# Patient Record
Sex: FEMALE | Race: BLACK OR AFRICAN AMERICAN | NOT HISPANIC OR LATINO | Employment: FULL TIME | URBAN - METROPOLITAN AREA
[De-identification: names, ages, dates, MRNs, and addresses within clinical notes are randomized per-mention and may not be internally consistent; named-entity substitution may affect disease eponyms.]

---

## 2018-12-10 PROBLEM — O26.892 ABDOMINAL PAIN IN PREGNANCY, SECOND TRIMESTER: Status: ACTIVE | Noted: 2018-12-10

## 2018-12-10 PROBLEM — R10.9 ABDOMINAL PAIN IN PREGNANCY, SECOND TRIMESTER: Status: ACTIVE | Noted: 2018-12-10

## 2019-04-02 PROBLEM — O36.8390 NON-REASSURING ELECTRONIC FETAL MONITORING TRACING: Status: ACTIVE | Noted: 2019-04-02

## 2019-04-11 PROBLEM — O14.13 HYPERTENSION IN PREGNANCY, PREECLAMPSIA, SEVERE, ANTEPARTUM, THIRD TRIMESTER: Status: ACTIVE | Noted: 2019-04-02

## 2020-07-29 PROBLEM — O36.8390 NON-REASSURING ELECTRONIC FETAL MONITORING TRACING: Status: ACTIVE | Noted: 2020-07-29

## 2020-07-30 PROBLEM — O36.8190 DECREASED FETAL MOVEMENT: Status: ACTIVE | Noted: 2020-07-30

## 2020-09-23 PROBLEM — O13.3 GESTATIONAL HYPERTENSION W/O SIGNIFICANT PROTEINURIA IN 3RD TRIMESTER: Status: ACTIVE | Noted: 2020-09-23

## 2020-10-01 PROBLEM — Z98.891 STATUS POST REPEAT LOW TRANSVERSE CESAREAN SECTION: Status: ACTIVE | Noted: 2020-10-01

## 2020-10-01 PROBLEM — Z34.90 PREGNANCY: Status: ACTIVE | Noted: 2020-10-01

## 2020-10-03 PROBLEM — Z98.891 STATUS POST REPEAT LOW TRANSVERSE CESAREAN SECTION: Status: RESOLVED | Noted: 2020-10-01 | Resolved: 2020-10-03

## 2020-12-28 PROBLEM — O13.3 GESTATIONAL HYPERTENSION W/O SIGNIFICANT PROTEINURIA IN 3RD TRIMESTER: Status: RESOLVED | Noted: 2020-09-23 | Resolved: 2020-12-28

## 2022-07-11 ENCOUNTER — OFFICE VISIT (OUTPATIENT)
Dept: URGENT CARE | Facility: CLINIC | Age: 27
End: 2022-07-11
Payer: MEDICAID

## 2022-07-11 VITALS
HEIGHT: 61 IN | BODY MASS INDEX: 24.55 KG/M2 | SYSTOLIC BLOOD PRESSURE: 128 MMHG | HEART RATE: 74 BPM | RESPIRATION RATE: 18 BRPM | WEIGHT: 130 LBS | OXYGEN SATURATION: 99 % | DIASTOLIC BLOOD PRESSURE: 85 MMHG | TEMPERATURE: 98 F

## 2022-07-11 DIAGNOSIS — H10.32 ACUTE BACTERIAL CONJUNCTIVITIS OF LEFT EYE: ICD-10-CM

## 2022-07-11 DIAGNOSIS — J02.9 ACUTE PHARYNGITIS, UNSPECIFIED ETIOLOGY: Primary | ICD-10-CM

## 2022-07-11 PROCEDURE — 1160F PR REVIEW ALL MEDS BY PRESCRIBER/CLIN PHARMACIST DOCUMENTED: ICD-10-PCS | Mod: CPTII,S$GLB,, | Performed by: NURSE PRACTITIONER

## 2022-07-11 PROCEDURE — 99204 OFFICE O/P NEW MOD 45 MIN: CPT | Mod: S$GLB,,, | Performed by: NURSE PRACTITIONER

## 2022-07-11 PROCEDURE — 3079F DIAST BP 80-89 MM HG: CPT | Mod: CPTII,S$GLB,, | Performed by: NURSE PRACTITIONER

## 2022-07-11 PROCEDURE — 3074F SYST BP LT 130 MM HG: CPT | Mod: CPTII,S$GLB,, | Performed by: NURSE PRACTITIONER

## 2022-07-11 PROCEDURE — 1159F MED LIST DOCD IN RCRD: CPT | Mod: CPTII,S$GLB,, | Performed by: NURSE PRACTITIONER

## 2022-07-11 PROCEDURE — 3008F PR BODY MASS INDEX (BMI) DOCUMENTED: ICD-10-PCS | Mod: CPTII,S$GLB,, | Performed by: NURSE PRACTITIONER

## 2022-07-11 PROCEDURE — 1159F PR MEDICATION LIST DOCUMENTED IN MEDICAL RECORD: ICD-10-PCS | Mod: CPTII,S$GLB,, | Performed by: NURSE PRACTITIONER

## 2022-07-11 PROCEDURE — 3074F PR MOST RECENT SYSTOLIC BLOOD PRESSURE < 130 MM HG: ICD-10-PCS | Mod: CPTII,S$GLB,, | Performed by: NURSE PRACTITIONER

## 2022-07-11 PROCEDURE — 99204 PR OFFICE/OUTPT VISIT, NEW, LEVL IV, 45-59 MIN: ICD-10-PCS | Mod: S$GLB,,, | Performed by: NURSE PRACTITIONER

## 2022-07-11 PROCEDURE — 1160F RVW MEDS BY RX/DR IN RCRD: CPT | Mod: CPTII,S$GLB,, | Performed by: NURSE PRACTITIONER

## 2022-07-11 PROCEDURE — 3008F BODY MASS INDEX DOCD: CPT | Mod: CPTII,S$GLB,, | Performed by: NURSE PRACTITIONER

## 2022-07-11 PROCEDURE — 3079F PR MOST RECENT DIASTOLIC BLOOD PRESSURE 80-89 MM HG: ICD-10-PCS | Mod: CPTII,S$GLB,, | Performed by: NURSE PRACTITIONER

## 2022-07-11 RX ORDER — OFLOXACIN 3 MG/ML
1 SOLUTION/ DROPS OPHTHALMIC 4 TIMES DAILY
Qty: 5 ML | Refills: 0 | Status: SHIPPED | OUTPATIENT
Start: 2022-07-11 | End: 2022-07-16

## 2022-07-11 RX ORDER — NAPROXEN 500 MG/1
500 TABLET ORAL 2 TIMES DAILY WITH MEALS
Qty: 14 TABLET | Refills: 0 | Status: SHIPPED | OUTPATIENT
Start: 2022-07-11 | End: 2022-07-18

## 2022-07-11 NOTE — PROGRESS NOTES
"Subjective:       Patient ID: Steve Sharma is a 26 y.o. female.    Vitals:  height is 5' 1" (1.549 m) and weight is 59 kg (130 lb). Her tympanic temperature is 98.4 °F (36.9 °C). Her blood pressure is 128/85 and her pulse is 74. Her respiration is 18 and oxygen saturation is 99%.     Chief Complaint: Conjunctivitis and Sore Throat    Pt states that she started with a sore throat yesterday and noticed spots in her throat today.     Conjunctivitis  This is a new problem. The current episode started today. The problem occurs constantly. The problem has been gradually worsening. Associated symptoms include a sore throat. Nothing aggravates the symptoms. She has tried nothing for the symptoms.   Sore Throat   This is a new problem. The current episode started yesterday. The problem has been waxing and waning. Neither side of throat is experiencing more pain than the other. There has been no fever. She has tried nothing for the symptoms.       HENT: Positive for sore throat.        Objective:      Physical Exam   Constitutional: She is oriented to person, place, and time. She appears well-developed. She is cooperative.  Non-toxic appearance. No distress.   HENT:   Head: Normocephalic and atraumatic.   Ears:   Right Ear: Tympanic membrane, external ear and ear canal normal.   Left Ear: Tympanic membrane, external ear and ear canal normal.   Nose: Nose normal. No mucosal edema, rhinorrhea or nasal deformity. No epistaxis. Right sinus exhibits no maxillary sinus tenderness and no frontal sinus tenderness. Left sinus exhibits no maxillary sinus tenderness and no frontal sinus tenderness.   Mouth/Throat: Uvula is midline and mucous membranes are normal. No trismus in the jaw. Normal dentition. No uvula swelling. Posterior oropharyngeal erythema present. No oropharyngeal exudate or posterior oropharyngeal edema.       Eyes: EOM and lids are normal. Pupils are equal, round, and reactive to light. Left eye exhibits discharge. " Right conjunctiva is not injected. Left conjunctiva is injected. No scleral icterus. vision grossly intact   Neck: Trachea normal and phonation normal. Neck supple. No edema present. No erythema present. No neck rigidity present.   Cardiovascular: Normal rate, regular rhythm, normal heart sounds and normal pulses.   Pulmonary/Chest: Effort normal and breath sounds normal. No respiratory distress. She has no decreased breath sounds. She has no rhonchi.   Abdominal: Normal appearance.   Musculoskeletal: Normal range of motion.         General: No deformity. Normal range of motion.   Neurological: She is alert and oriented to person, place, and time. She exhibits normal muscle tone. Coordination normal.   Skin: Skin is warm, dry, intact, not diaphoretic and not pale.   Psychiatric: Her speech is normal and behavior is normal. Judgment and thought content normal.   Nursing note and vitals reviewed.        Assessment:       1. Acute pharyngitis, unspecified etiology    2. Acute bacterial conjunctivitis of left eye          Plan:         Acute pharyngitis, unspecified etiology  -     naproxen (NAPROSYN) 500 MG tablet; Take 1 tablet (500 mg total) by mouth 2 (two) times daily with meals. for 7 days  Dispense: 14 tablet; Refill: 0    Acute bacterial conjunctivitis of left eye  -     ofloxacin (OCUFLOX) 0.3 % ophthalmic solution; Place 1 drop into the left eye 4 (four) times daily. for 5 days  Dispense: 5 mL; Refill: 0

## 2022-07-11 NOTE — LETTER
July 11, 2022      [facility]  [address], [address]  [place] [address]  Phone: [phone]  Fax: [phone]       Patient: Steve Sharma   YOB: 1995  Date of Visit: 07/11/2022    To Whom It May Concern:    Fernanda Sharma  was at [facility] on 07/11/2022. The patient may return to work/school on 7/13/2022 with no restrictions if symptoms have improved. If you have any questions or concerns, or if I can be of further assistance, please do not hesitate to contact me.    Sincerely,    Rebecca Oreilly NP

## 2022-07-12 NOTE — PATIENT INSTRUCTIONS
EYE   1) Use the eye drops as prescribed while awake initially. Use the eye drops for 24 hours after the last day of eye symptoms.  You are contagious until you have been on abx for at least 24 hours.  2) Wash your hands regularly to help prevent the spread of infection.  3) It is also a good idea to change your pillow case each morning until you are symptom free to help prevent spread of infection.  4) Do not wear your contact lens (if you use them) for at least 5 days after you stop having symptoms and are rechecked by your doctor. Throw away the contacts, contact solution and carrying case you were using and start with new material. You may also need to throw away any eye makeup/mascara that you used while your eye was irritated.  5) If you develop worsening eye symptoms or change in your vision, seek medical care immediately, either with your ophthalomologist or the ER.  6) If your condition fails to improve in a timely manner, you should receive another evaluation by your Primary Care Provider/Pediatrician to discuss your concerns or return to urgent care for a recheck.  If your condition worsens at any time, you should report immediately to your nearest Emergency Department for further evaluation. **You must understand that you have received Urgent Care treatment only and that you may be released before all of your medical problems are known or treated. You, the patient, are responsible to arrange for follow-up care as instructed.      The following are suggestions to help you with upper respiratory symptoms.    Congestion:    Nasal Saline  Nasal saline is available over the counter. There are several different commercial preparations such as Ocean spray and Ayr spray. There is no limit on the use of Nasal saline. Saline is used by snorting the mist up into the nose then later gently blowing the nose to get rid of any secretions that it has loosened.     Mucous Thinners and Decongestants  Mucous thinners and  decongestants are used to shrink down the tissues and promote sinus drainage. There are multiple prescription and over-the-counter medications available. A mucous thinner will tend to be drying unless you are also drinking plenty of water when taking these. If you have high blood pressure, it is very important to monitor your pressure while on decongestants. The mucous thinner/decongestant combinations are typically given twice per day. However, some people will be unable to tolerate these at night and should only take them once per day.    Decongestant Nasal Sprays  Over-the-counter decongestant nasal spray such as Afrin, may be helpful as an initial step in treating upper respiratory infections. This spray can be used for up to approximately 3 days and is used no more than twice per day. Topical nasal decongestant spray for longer than 5 days will result in a physical addiction, in which the nasal lining will become significantly swollen and irritated until the spray is used again. May cause elevated blood pressure    Use pseudoephedrine (behind the counter)  for sinus pressure and congestion- Pseudoephedrine 30 mg up to 240 mg /day. Common brands include Sudafed, Zephrex-D Wal-phed.  Warning: It can raise your blood pressure and give you palpitations, avoid with history of high blood pressure, palpitations, and severe cardiac disease.  Coricidin HBP is okay to use if you have high blood pressure.     Nasal Steroids  Nasal steroid medications such as Flonase are useful for upper respiratory infections, allergies, and sensitivities to airborne irritants. Unfortunately, they do not begin to work for 1-2 days, and they do not reach their maximum benefit for approximately 2-3 weeks. Initial therapy is typically 2 puffs per nostril twice per day. This should be used for only a few days, then the maintenance dosage is one or two puffs per nostril once per day. This can be done at any time of the day. The most effective  way to use any nasal medication is to look down at your toes when spraying it in. Aim slightly away from the septum (dividing plate between the nostrils), and gently inhale. This ensures that the spray will go into the sinus cavities and not straight up into the nose. A good way to avoid spraying onto the septum is to use the right hand to spray into the left nostril, and vice versa for the right nostril. Occasionally, nasal steroids can increase the risk of nosebleeds, but in general they are very well tolerated. If you develop a bloody nose, stop using the medication immediately.    Clear Runny Nose/Allergic Rhinitis:  Use an antihistamine to help dry you out.   Antihistamines  Antihistamines are available both over-the-counter and as a prescription. There are also various decongestant and antihistamine combinations available such as Claritin, Allegra, and Zyrtec. It is best to take any antihistamine-decongestant combination in the morning to avoid insomnia. Zyrtec should probably be taken at night, in order to reduce the chance of sleepiness during the daytime. If there is a significant infection present and secretions are already thickened, it is recommended to discontinue antihistamines and use a mucous thinner/decongestant combination.      Oral Steroids  Oral steroids can be used with more sever infections. Often, they are the only medications that will reduce the symptoms of pressure and allow the nasal sinuses to drain. These are best taken on a full stomach and earlier in the day is better. They may give you some irritability, stomach upset, or hyperactivity. This can also interfere with sleep.     A person who has high blood pressure or diabetes should be very careful to monitor their blood pressure or blood glucose while taking steroids. Steroids can have multiple side effects especially when taken long-term. Short-term doses are usually very well tolerated and extremely effective in controlling the  symptoms associated with acute and chronic sinus infections and severe allergies. The use of steroids for greater than approximately seven days requires a tapering down in order to discontinue them. You should not abruptly stop your steroid if you have been taking the same dose for greater than one week.    Antibiotic Treatment  Finally, when all of these other measures have failed, and a bacterial infection is present, an antibiotic will be prescribed. The most common symptoms of acute sinusitis of a bacterial nature are pain, pressure, and thick and colored nasal drainage. However, not all colored drainage means that there is a bacterial infection present. According to the Center for Disease Control, only 2% of colds will progress to result in bacterial sinusitis. Most upper respiratory infections should NOT be treated with antibiotics. Antibiotics should be reserved for upper respiratory infections which last longer than 10 days, or which worsen after 5 to 7 days of treatment. The use of antibiotics for nonbacterial upper respiratory infections has resulted in a severe problem with the emergence of bacteria which are resistant to multiple forms of antibiotics, and some bacteria are currently only treatable with intravenous antibiotics.    Body Aches/Pains/Fever  For patients who are not allergic to and are not on anticoagulants, you can alternate Tylenol every 4 hours and Motrin every 6 hours for fever above 100.4F and/or pain.  For patients who are allergic or intolerant to NSAIDS, have gastritis, gastric ulcers, or history of GI bleeds, are pregnant, or are on anticoagulant therapy, you can take Tylenol every 4 hours as needed for fever above 100.4F and/or pain.     Maintain adequate hydration -  Rest and keep yourself/patient well hydrated. For adults, it is recommended to drink at least 8 glasses of water daily.  This may help thin secretions and soothe the respiratory mucosa.     Sore Throat  Perform warm,  salt water gargles (1/2 tsp salt to 1 cup warm water) to help reduce inflammation and throat discomfort. Chloraseptic sprays and throat lozenges will also help with your throat pain.    COUGH  A viral cough may linger for 3 to 4 weeks but should steadily improve over time. Coughing is the body's natural way to clear mucus and help get rid of bacteria and viruses. Therefore, cough suppressants are usually not recommended.      Use mucinex (guaifenisin) up to 2400mg/day to help clear and break up/loosen mucus/congestion from the chest when you have a cold or flu.      Common cough suppressants include the ingredient dextromethorphan or DM, (such as Mucinex DM) available over-the-counter and can be used for cough to stop the tickle in the back of your throat.      ? Honey may be beneficial, especially on nocturnal cough 1 to 2 teaspoons can be taken straight or diluted in tea, juice or other liquid.    The antioxidants in honey are an important contributor to its decongestant properties. Generally speaking, darker honey contains more antioxidants. Buckwheat and avocado honey are particularly good choices. If these honeys are not available in your area, choose the darkest honey you can find.    Take all medications as directed. If you have been prescribed antibiotics, make sure to complete them.     If your condition fails to improve in a timely manner, you should receive another evaluation by your Primary Care Provider to discuss your concerns or return to urgent care for a recheck.      **You must understand that you have received Urgent Care treatment only and that you may be released before all of your medical problems are known or treated. You, the patient, are responsible to arrange for follow-up care as instructed. If your condition worsens at any time, you should report immediately to your nearest Emergency Department for further evaluation.

## 2023-09-22 PROBLEM — O16.9 HYPERTENSION AFFECTING PREGNANCY: Status: ACTIVE | Noted: 2023-09-22

## 2023-09-23 PROBLEM — O36.8190 DECREASED FETAL MOVEMENT: Status: RESOLVED | Noted: 2020-07-30 | Resolved: 2023-09-23

## 2023-09-23 PROBLEM — O36.8390 NON-REASSURING ELECTRONIC FETAL MONITORING TRACING: Status: RESOLVED | Noted: 2020-07-29 | Resolved: 2023-09-23

## 2023-09-23 PROBLEM — O14.12 SEVERE PRE-ECLAMPSIA IN SECOND TRIMESTER: Status: ACTIVE | Noted: 2023-09-23

## 2023-09-24 ENCOUNTER — HOSPITAL ENCOUNTER (INPATIENT)
Facility: OTHER | Age: 28
LOS: 5 days | Discharge: HOME | End: 2023-09-29
Attending: OBSTETRICS & GYNECOLOGY | Admitting: OBSTETRICS & GYNECOLOGY
Payer: MEDICAID

## 2023-09-24 ENCOUNTER — ANESTHESIA (OUTPATIENT)
Dept: OBSTETRICS AND GYNECOLOGY | Facility: OTHER | Age: 28
End: 2023-09-24

## 2023-09-24 ENCOUNTER — ANESTHESIA EVENT (OUTPATIENT)
Dept: OBSTETRICS AND GYNECOLOGY | Facility: OTHER | Age: 28
End: 2023-09-24

## 2023-09-24 DIAGNOSIS — O14.10 PRE-ECLAMPSIA, SEVERE: ICD-10-CM

## 2023-09-24 DIAGNOSIS — O14.12 SEVERE PRE-ECLAMPSIA IN SECOND TRIMESTER: Primary | ICD-10-CM

## 2023-09-24 DIAGNOSIS — Z98.891 HISTORY OF CESAREAN SECTION: ICD-10-CM

## 2023-09-24 DIAGNOSIS — R07.89 CHEST TIGHTNESS: ICD-10-CM

## 2023-09-24 DIAGNOSIS — O14.90 PREECLAMPSIA: ICD-10-CM

## 2023-09-24 PROBLEM — Z34.90 PREGNANCY: Status: RESOLVED | Noted: 2020-10-01 | Resolved: 2023-09-24

## 2023-09-24 PROBLEM — O16.9 HYPERTENSION AFFECTING PREGNANCY: Status: RESOLVED | Noted: 2023-09-22 | Resolved: 2023-09-24

## 2023-09-24 PROBLEM — Z3A.25 25 WEEKS GESTATION OF PREGNANCY: Status: ACTIVE | Noted: 2023-09-24

## 2023-09-24 PROBLEM — D64.9 ANEMIA: Status: ACTIVE | Noted: 2023-09-24

## 2023-09-24 LAB
ALBUMIN SERPL BCP-MCNC: 2.2 G/DL (ref 3.5–5.2)
ALP SERPL-CCNC: 69 U/L (ref 55–135)
ALT SERPL W/O P-5'-P-CCNC: 10 U/L (ref 10–44)
ANION GAP SERPL CALC-SCNC: 7 MMOL/L (ref 8–16)
AST SERPL-CCNC: 13 U/L (ref 10–40)
BASOPHILS # BLD AUTO: 0.02 K/UL (ref 0–0.2)
BASOPHILS NFR BLD: 0.1 % (ref 0–1.9)
BILIRUB SERPL-MCNC: 0.1 MG/DL (ref 0.1–1)
BUN SERPL-MCNC: 15 MG/DL (ref 6–20)
CALCIUM SERPL-MCNC: 6.4 MG/DL (ref 8.7–10.5)
CHLORIDE SERPL-SCNC: 107 MMOL/L (ref 95–110)
CO2 SERPL-SCNC: 21 MMOL/L (ref 23–29)
CREAT SERPL-MCNC: 0.7 MG/DL (ref 0.5–1.4)
CREAT UR-MCNC: 71.1 MG/DL (ref 15–325)
DIFFERENTIAL METHOD: ABNORMAL
EOSINOPHIL # BLD AUTO: 0 K/UL (ref 0–0.5)
EOSINOPHIL NFR BLD: 0.1 % (ref 0–8)
ERYTHROCYTE [DISTWIDTH] IN BLOOD BY AUTOMATED COUNT: 13.9 % (ref 11.5–14.5)
EST. GFR  (NO RACE VARIABLE): >60 ML/MIN/1.73 M^2
GLUCOSE SERPL-MCNC: 109 MG/DL (ref 70–110)
HCT VFR BLD AUTO: 29.5 % (ref 37–48.5)
HGB BLD-MCNC: 9.5 G/DL (ref 12–16)
IMM GRANULOCYTES # BLD AUTO: 0.17 K/UL (ref 0–0.04)
IMM GRANULOCYTES NFR BLD AUTO: 1.1 % (ref 0–0.5)
LYMPHOCYTES # BLD AUTO: 1.5 K/UL (ref 1–4.8)
LYMPHOCYTES NFR BLD: 9.4 % (ref 18–48)
MCH RBC QN AUTO: 25.5 PG (ref 27–31)
MCHC RBC AUTO-ENTMCNC: 32.2 G/DL (ref 32–36)
MCV RBC AUTO: 79 FL (ref 82–98)
MONOCYTES # BLD AUTO: 1.6 K/UL (ref 0.3–1)
MONOCYTES NFR BLD: 10.3 % (ref 4–15)
NEUTROPHILS # BLD AUTO: 12.4 K/UL (ref 1.8–7.7)
NEUTROPHILS NFR BLD: 79 % (ref 38–73)
NRBC BLD-RTO: 0 /100 WBC
PLATELET # BLD AUTO: 401 K/UL (ref 150–450)
PMV BLD AUTO: 9.5 FL (ref 9.2–12.9)
POTASSIUM SERPL-SCNC: 3.8 MMOL/L (ref 3.5–5.1)
PROT SERPL-MCNC: 5.4 G/DL (ref 6–8.4)
PROT UR-MCNC: 266 MG/DL (ref 0–15)
PROT/CREAT UR: 3.74 MG/G{CREAT} (ref 0–0.2)
RBC # BLD AUTO: 3.72 M/UL (ref 4–5.4)
SODIUM SERPL-SCNC: 135 MMOL/L (ref 136–145)
WBC # BLD AUTO: 15.7 K/UL (ref 3.9–12.7)

## 2023-09-24 PROCEDURE — 11000001 HC ACUTE MED/SURG PRIVATE ROOM

## 2023-09-24 PROCEDURE — 36415 COLL VENOUS BLD VENIPUNCTURE: CPT | Performed by: OBSTETRICS & GYNECOLOGY

## 2023-09-24 PROCEDURE — 59025 PR FETAL 2N-STRESS TEST: ICD-10-PCS | Mod: 26,,, | Performed by: OBSTETRICS & GYNECOLOGY

## 2023-09-24 PROCEDURE — 99223 PR INITIAL HOSPITAL CARE,LEVL III: ICD-10-PCS | Mod: 25,,, | Performed by: OBSTETRICS & GYNECOLOGY

## 2023-09-24 PROCEDURE — 25000003 PHARM REV CODE 250

## 2023-09-24 PROCEDURE — 86592 SYPHILIS TEST NON-TREP QUAL: CPT | Performed by: OBSTETRICS & GYNECOLOGY

## 2023-09-24 PROCEDURE — 80053 COMPREHEN METABOLIC PANEL: CPT

## 2023-09-24 PROCEDURE — 93005 ELECTROCARDIOGRAM TRACING: CPT

## 2023-09-24 PROCEDURE — 99900035 HC TECH TIME PER 15 MIN (STAT)

## 2023-09-24 PROCEDURE — 93010 ELECTROCARDIOGRAM REPORT: CPT | Mod: ,,, | Performed by: INTERNAL MEDICINE

## 2023-09-24 PROCEDURE — 84156 ASSAY OF PROTEIN URINE: CPT

## 2023-09-24 PROCEDURE — 36415 COLL VENOUS BLD VENIPUNCTURE: CPT

## 2023-09-24 PROCEDURE — 93010 EKG 12-LEAD: ICD-10-PCS | Mod: ,,, | Performed by: INTERNAL MEDICINE

## 2023-09-24 PROCEDURE — 59025 FETAL NON-STRESS TEST: CPT | Mod: 26,,, | Performed by: OBSTETRICS & GYNECOLOGY

## 2023-09-24 PROCEDURE — 63600175 PHARM REV CODE 636 W HCPCS

## 2023-09-24 PROCEDURE — 99223 1ST HOSP IP/OBS HIGH 75: CPT | Mod: 25,,, | Performed by: OBSTETRICS & GYNECOLOGY

## 2023-09-24 PROCEDURE — 85025 COMPLETE CBC W/AUTO DIFF WBC: CPT

## 2023-09-24 RX ORDER — ASPIRIN 81 MG/1
81 TABLET ORAL DAILY
Status: DISCONTINUED | OUTPATIENT
Start: 2023-09-24 | End: 2023-09-26

## 2023-09-24 RX ORDER — DIPHENHYDRAMINE HCL 25 MG
25 CAPSULE ORAL EVERY 4 HOURS PRN
Status: DISCONTINUED | OUTPATIENT
Start: 2023-09-24 | End: 2023-09-24

## 2023-09-24 RX ORDER — ONDANSETRON 8 MG/1
8 TABLET, ORALLY DISINTEGRATING ORAL EVERY 8 HOURS PRN
Status: DISCONTINUED | OUTPATIENT
Start: 2023-09-24 | End: 2023-09-24

## 2023-09-24 RX ORDER — DIPHENHYDRAMINE HYDROCHLORIDE 50 MG/ML
25 INJECTION INTRAMUSCULAR; INTRAVENOUS EVERY 4 HOURS PRN
Status: DISCONTINUED | OUTPATIENT
Start: 2023-09-24 | End: 2023-09-24

## 2023-09-24 RX ORDER — PRENATAL WITH FERROUS FUM AND FOLIC ACID 3080; 920; 120; 400; 22; 1.84; 3; 20; 10; 1; 12; 200; 27; 25; 2 [IU]/1; [IU]/1; MG/1; [IU]/1; MG/1; MG/1; MG/1; MG/1; MG/1; MG/1; UG/1; MG/1; MG/1; MG/1; MG/1
1 TABLET ORAL DAILY
Status: DISCONTINUED | OUTPATIENT
Start: 2023-09-24 | End: 2023-09-27

## 2023-09-24 RX ORDER — DIPHENHYDRAMINE HYDROCHLORIDE 50 MG/ML
25 INJECTION INTRAMUSCULAR; INTRAVENOUS EVERY 4 HOURS PRN
Status: DISCONTINUED | OUTPATIENT
Start: 2023-09-24 | End: 2023-09-27

## 2023-09-24 RX ORDER — DOCUSATE SODIUM 100 MG/1
100 CAPSULE, LIQUID FILLED ORAL DAILY
Status: DISCONTINUED | OUTPATIENT
Start: 2023-09-24 | End: 2023-09-27

## 2023-09-24 RX ORDER — DIPHENHYDRAMINE HCL 25 MG
25 CAPSULE ORAL EVERY 4 HOURS PRN
Status: DISCONTINUED | OUTPATIENT
Start: 2023-09-24 | End: 2023-09-27

## 2023-09-24 RX ORDER — ACETAMINOPHEN 325 MG/1
650 TABLET ORAL EVERY 6 HOURS PRN
Status: DISCONTINUED | OUTPATIENT
Start: 2023-09-24 | End: 2023-09-27

## 2023-09-24 RX ORDER — PRENATAL WITH FERROUS FUM AND FOLIC ACID 3080; 920; 120; 400; 22; 1.84; 3; 20; 10; 1; 12; 200; 27; 25; 2 [IU]/1; [IU]/1; MG/1; [IU]/1; MG/1; MG/1; MG/1; MG/1; MG/1; MG/1; UG/1; MG/1; MG/1; MG/1; MG/1
1 TABLET ORAL DAILY
Status: DISCONTINUED | OUTPATIENT
Start: 2023-09-24 | End: 2023-09-24

## 2023-09-24 RX ORDER — NIFEDIPINE 30 MG/1
30 TABLET, EXTENDED RELEASE ORAL DAILY
Status: DISCONTINUED | OUTPATIENT
Start: 2023-09-24 | End: 2023-09-25

## 2023-09-24 RX ORDER — PROCHLORPERAZINE EDISYLATE 5 MG/ML
5 INJECTION INTRAMUSCULAR; INTRAVENOUS EVERY 6 HOURS PRN
Status: DISCONTINUED | OUTPATIENT
Start: 2023-09-24 | End: 2023-09-24

## 2023-09-24 RX ORDER — SODIUM CHLORIDE 0.9 % (FLUSH) 0.9 %
10 SYRINGE (ML) INJECTION
Status: DISCONTINUED | OUTPATIENT
Start: 2023-09-24 | End: 2023-09-24

## 2023-09-24 RX ORDER — SIMETHICONE 80 MG
1 TABLET,CHEWABLE ORAL EVERY 6 HOURS PRN
Status: DISCONTINUED | OUTPATIENT
Start: 2023-09-24 | End: 2023-09-27

## 2023-09-24 RX ORDER — SIMETHICONE 80 MG
1 TABLET,CHEWABLE ORAL EVERY 6 HOURS PRN
Status: DISCONTINUED | OUTPATIENT
Start: 2023-09-24 | End: 2023-09-24

## 2023-09-24 RX ORDER — SODIUM CHLORIDE 0.9 % (FLUSH) 0.9 %
10 SYRINGE (ML) INJECTION
Status: DISCONTINUED | OUTPATIENT
Start: 2023-09-24 | End: 2023-09-27

## 2023-09-24 RX ORDER — HYDRALAZINE HYDROCHLORIDE 20 MG/ML
5 INJECTION INTRAMUSCULAR; INTRAVENOUS ONCE
Status: COMPLETED | OUTPATIENT
Start: 2023-09-24 | End: 2023-09-24

## 2023-09-24 RX ORDER — AMOXICILLIN 250 MG
1 CAPSULE ORAL NIGHTLY PRN
Status: DISCONTINUED | OUTPATIENT
Start: 2023-09-24 | End: 2023-09-27

## 2023-09-24 RX ORDER — ONDANSETRON 8 MG/1
8 TABLET, ORALLY DISINTEGRATING ORAL EVERY 8 HOURS PRN
Status: DISCONTINUED | OUTPATIENT
Start: 2023-09-24 | End: 2023-09-27

## 2023-09-24 RX ORDER — ACETAMINOPHEN 325 MG/1
650 TABLET ORAL EVERY 6 HOURS PRN
Status: DISCONTINUED | OUTPATIENT
Start: 2023-09-24 | End: 2023-09-24

## 2023-09-24 RX ORDER — PROCHLORPERAZINE EDISYLATE 5 MG/ML
5 INJECTION INTRAMUSCULAR; INTRAVENOUS EVERY 6 HOURS PRN
Status: DISCONTINUED | OUTPATIENT
Start: 2023-09-24 | End: 2023-09-27

## 2023-09-24 RX ORDER — AMOXICILLIN 250 MG
1 CAPSULE ORAL NIGHTLY PRN
Status: DISCONTINUED | OUTPATIENT
Start: 2023-09-24 | End: 2023-09-24

## 2023-09-24 RX ORDER — LANOLIN ALCOHOL/MO/W.PET/CERES
1 CREAM (GRAM) TOPICAL DAILY
Status: DISCONTINUED | OUTPATIENT
Start: 2023-09-24 | End: 2023-09-29 | Stop reason: HOSPADM

## 2023-09-24 RX ADMIN — PRENATAL VIT W/ FE FUMARATE-FA TAB 27-0.8 MG 1 TABLET: 27-0.8 TAB at 08:09

## 2023-09-24 RX ADMIN — HYDRALAZINE HYDROCHLORIDE 5 MG: 20 INJECTION INTRAMUSCULAR; INTRAVENOUS at 08:09

## 2023-09-24 RX ADMIN — ASPIRIN 81 MG: 81 TABLET, COATED ORAL at 08:09

## 2023-09-24 RX ADMIN — NIFEDIPINE 30 MG: 30 TABLET, FILM COATED, EXTENDED RELEASE ORAL at 08:09

## 2023-09-24 RX ADMIN — DOCUSATE SODIUM 100 MG: 100 CAPSULE, LIQUID FILLED ORAL at 08:09

## 2023-09-24 RX ADMIN — FERROUS SULFATE TAB 325 MG (65 MG ELEMENTAL FE) 1 EACH: 325 (65 FE) TAB at 08:09

## 2023-09-24 NOTE — H&P
[facility]  Obstetrics  History & Physical    Patient Name: Steve Benton  MRN: [id number]  Admission Date: 2023  Primary Care Provider: Vivienne, Primary Doctor    Subjective:     Principal Problem:<principal problem not specified>    History of Present Illness:  27 F, 25 w 3d, , transferred from outside hospital 2 for preeclampsia management.    Patient presented to OSH with bilateral lower extremity edema and was subsequently worked up for preeclampsia. Asymptomatic for other signs of pre-eclampsia, including headache, vision changes, shortness of breath, RUQ pain. CBC, CMP wnl. P/Cr: 0.08 . 24 hour urine protein 3564mg. Patient was diagnosed with Pre-Eclampsia. She received 4 doses of Hydralazine (last dose was 23 at 1940). She was also maintained on magnesium sulfate until  for neuroprotection and for seizure prophylaxis. Pt has also completed course of betamethasone on 23 and 23.  This pregnancy has been complicated by previous  section x 2, prior  delivery, obesity, anemia, and now pre-eclampsia.    OBHx  G1  at term  G2 pLTCS at 35w4d for PreE w/ SF (BP, HA) as per patient  G3 rLTCS at term    Medical history:  Denies any other pertinent medical history.   Patient states she has not been on any hypertensive agents throughout her pregnancies or in between. She is currently asymptomatic.       Obstetric HPI:  Patient reports None contractions, active fetal movement, No vaginal bleeding , No loss of fluid     OB History    Para Term  AB Living   4 3 2 1 0 3   SAB IAB Ectopic Multiple Live Births   0 0 0 0 3      # Outcome Date GA Lbr Edward/2nd Weight Sex Delivery Anes PTL Lv   4 Current            3 Term 10/01/20 39w0d  2.975 kg (6 lb 8.9 oz) F CS-LTranv Spinal  MISSAEL      Name: SHADIA BENTON      Apgar1: 8  Apgar5: 9   2  19 35w4d  2.32 kg (5 lb 1.8 oz) F CS-LTranv EPI  MISSAEL      Complications: Fetal Intolerance, Failure to Progress  in First Stage      Name: SHADIA BENTON      Apgar1: 8  Apgar5: 8   1 Term     F Vag-Spont EPI N MISSAEL     Past Medical History:   Diagnosis Date    Anemia     Hypertension     AFTER FIRST CHILD    Pregnancy          Past Surgical History:   Procedure Laterality Date     SECTION N/A 2019    Procedure:  SECTION;  Surgeon: Kyree Liu MD;  Location: [facility] OR;  Service: OB/GYN;  Laterality: N/A;     SECTION N/A 10/1/2020    Procedure:  SECTION;  Surgeon: Kyree Liu MD;  Location: [facility] OR;  Service: OB/GYN;  Laterality: N/A;    VAGINAL DELIVERY         PTA Medications   Medication Sig    aspirin (ECOTRIN) 81 MG EC tablet Take 81 mg by mouth once daily.    prenatal vits62/FA/om3/dha/epa (PRENATAL GUMMY ORAL) Take 2 tablets by mouth once daily.       Review of patient's allergies indicates:   Allergen Reactions    Biaxin [clarithromycin]      Pt states when she was young and unsure of type of reaction        Family History       Problem Relation (Age of Onset)    Hypertension Mother          Tobacco Use    Smoking status: Never    Smokeless tobacco: Never   Substance and Sexual Activity    Alcohol use: No    Drug use: No    Sexual activity: Yes     Review of Systems   Constitutional:  Negative for appetite change, diaphoresis, fever and unexpected weight change.   Eyes:  Negative for visual disturbance.   Respiratory:  Negative for cough.    Cardiovascular:  Positive for leg swelling. Negative for chest pain.   Gastrointestinal:  Negative for abdominal pain, constipation, diarrhea, nausea and vomiting.   Genitourinary:  Negative for vaginal bleeding, vaginal discharge, vaginal pain, vaginal dryness and vaginal odor.   Musculoskeletal:  Negative for arthralgias and back pain.   Neurological:  Negative for headaches.   Psychiatric/Behavioral:  The patient is not nervous/anxious.       Objective:     Vital Signs (Most Recent):    Vital Signs (24h  Range):  Temp:  [97.5 °F (36.4 °C)-98.2 °F (36.8 °C)] 98.2 °F (36.8 °C)  Pulse:  [71-96] 91  Resp:  [18] 18  SpO2:  [97 %] 97 %  BP: (131-179)/(82-97) 152/85        There is no height or weight on file to calculate BMI.    FHT: 135 bpm, mod variability, + accels, - decels; overall reassuring and appropriate for gestational age   TOCO:  None      Physical Exam:   Constitutional: She is oriented to person, place, and time. She appears well-developed and well-nourished.    HENT:   Head: Normocephalic.    Eyes: Pupils are equal, round, and reactive to light.     Cardiovascular:  Normal rate.             Pulmonary/Chest: Effort normal. No respiratory distress.        Abdominal: Soft. Bowel sounds are normal. There is no abdominal tenderness. There is no rebound and no guarding.     Genitourinary:    Vagina normal.   No  no vaginal discharge in the vagina.           Musculoskeletal: Normal range of motion.       Neurological: She is alert and oriented to person, place, and time.    Skin: Skin is warm and dry.    Psychiatric: She has a normal mood and affect. Her behavior is normal. Judgment and thought content normal.        Cervix: Deferred     Significant Labs:  Lab Results   Component Value Date    [facility] B POS 10/01/2020     Recent Labs   Lab 23  0245   WBC 15.20* 15.70*   HGB 9.5* 9.5*   HCT 29.6* 29.5*   MCV 79* 79*    401       Recent Labs   Lab 23  0859 23  0245   *  --  135*   K 3.8  --  3.8     --  107   CO2 19*  --  21*   BUN 13  --  15   CREATININE 0.58*  --  0.7   *  --  109   PROT 5.9*  --  5.4*   BILITOT 0.3  --  0.1   ALKPHOS 81  --  69   ALT 18  --  10   AST 26  --  13   MG  --  7.5*  --        24H U: 3564 (OSH)     Assessment/Plan:     27 y.o. female  at 25w4d for:    Anemia  - H/H stable   - Continue Fe/Colace   - Asymptomatic     25 weeks gestation of pregnancy  - Primary OB: Andre  - Delivery consents, including  vaginal, operative, and , and blood consents reviewed and signed at time of admission.   - Presentation: vtx; If moving forward with delivery, will rescan to determine fetal pesentation   - Growth Ultrasound: will perform at [facility] and order formal during weekday   - Diet: Regular  - Monitoring: NST/TOCO BID  - Labs: UTD  - Aneuploidy Screening: Not documented   - 1hr GTT: Not completed; Will perform once out of steroid affect   - GBS: unknown; Will collect if moving towards delivery   - BMZ course completed at OSH; Can receive rescue BMZ as soon as  if moving towards delivery     - Magnesium to be administered (6+2) for fetal neuroprotection if delivery indicated before 32w0d. If > 32w0d, administer (4+2) for maternal seizure prophylaxis.    - Tdap: Needs between 27-36 weeks   - Continue PNV  - Contraception: Undecided; Will continue to discuss     History of  section  - History of C/S x2, low transverse   - Desires repeat  section     Severe preeclampsia, second trimester  - At OSH, required multiple acute anti-hypertensive medications, received BMZ, and was previously on magnesium for seizure prophylaxis. Patient has since been de-escalated and transferred to [facility] for higher level of care.   - Asymptomatic for PreE s/s   - Current Regimen:    - Procardia XL 30 mg ()   - VSS, one non-sustained blood pressure with remainder mild range   - Labs, Will continue to trend q 72hrs (next ):    - PCR 0.08, 24hrU 3564    - Plts 401    - Cr 0.7    - AST/ALT 13/10   - EKG pending  - Will continue to trend and continue inpatient management until delivery at 34w0d or if maternal/fetal indication sooner   - Will escalate care if s/s PreE with magnesium               Elly Darden MD  Obstetrics  [facility]

## 2023-09-24 NOTE — HOSPITAL COURSE
23: Transferred from OSH to [facility] for PreE w/ SF. Asymptomatic for PreE s/s. Initially severely elevated BP, followed by non-severe. All other vitals wnl. EFM/TOCO RR/ No CTX. Started on Procardia XL 30 mg. Will continue inpatient management.   2023: HD#2. NAEO. Asymptomatic for PreE s/s. VSS. Current anti-hypertensive regimen: Procardia XL 30mg. Will continue inpatient management until 34w0d unless maternal/fetal indication.  2023: HD#3. NAEO. Asymptomatic for PreE s/s. VSS, BP mild range. Current anti-hypertensive regimen: Procardia XL 60mg qD. Will continue inpatient management until 34w0d unless maternal/fetal indication.    POD #2. S/p Repeat Classical , NAEON, VSS, BP WNL, Procardia XL 60/30, Labetalol 200BID, PO Furosmedie 20mg, Enoxaparin 40mg

## 2023-09-24 NOTE — PROGRESS NOTES
09/24/23 0801 09/24/23 0810 09/24/23 0820   Vital Signs   SpO2  --  96 % 96 %   BP (!) 182/100  --  (!) 169/95   MAP (mmHg) 133  --  123       MD Vito notified, see MAR

## 2023-09-24 NOTE — ANESTHESIA PREPROCEDURE EVALUATION
[facility]  Anesthesia Pre-Operative Evaluation         Patient Name: Steve Sharma  YOB: 1995  MRN: [id number]    2023      Steve Sharma is a 27 y.o. female  @ 25w4d who presents as transfer from OSH due to pre-eclampsia w SF (BP). Now asymptomatic. Denies cardiopulmonary, bleeding, or spine disorders. No problems with past neuraxial anesthesia. Planning for inpatient management until delivery at 34wks unless sooner indication.    IUP c/b previous  section x 2, prior  delivery, obesity, anemia, and now pre-eclampsia    OB History    Para Term  AB Living   4 3 2 1   3   SAB IAB Ectopic Multiple Live Births         0 3      # Outcome Date GA Lbr Edward/2nd Weight Sex Delivery Anes PTL Lv   4 Current            3 Term 10/01/20 39w0d  2.975 kg (6 lb 8.9 oz) F CS-LTranv Spinal  MISSAEL   2  19 35w4d  2.32 kg (5 lb 1.8 oz) F CS-LTranv EPI  MISSAEL      Complications: Fetal Intolerance, Failure to Progress in First Stage   1 Term     F Vag-Spont EPI N MISSAEL       Review of patient's allergies indicates:   Allergen Reactions    Biaxin [clarithromycin]      Pt states when she was young and unsure of type of reaction       Wt Readings from Last 1 Encounters:   23 0444 79.2 kg (174 lb 9.6 oz)   23 0605 75.8 kg (167 lb 3.2 oz)   23 1531 69.4 kg (153 lb)       BP Readings from Last 3 Encounters:   23 (!) 145/90   23 (!) 152/85   22 128/85       Patient Active Problem List   Diagnosis    Abdominal pain in pregnancy, second trimester    Severe preeclampsia, second trimester    History of  section    25 weeks gestation of pregnancy    Anemia       Past Surgical History:   Procedure Laterality Date     SECTION N/A 2019    Procedure:  SECTION;  Surgeon: Kyree Liu MD;  Location: [facility] OR;  Service: OB/GYN;  Laterality: N/A;     SECTION N/A 10/1/2020    Procedure:  " SECTION;  Surgeon: Kyree Liu MD;  Location: [facility];  Service: OB/GYN;  Laterality: N/A;    VAGINAL DELIVERY         Social History     Socioeconomic History    Marital status: Single   Tobacco Use    Smoking status: Never    Smokeless tobacco: Never   Substance and Sexual Activity    Alcohol use: No    Drug use: No    Sexual activity: Yes         Chemistry        Component Value Date/Time     (L) 2023 0245    K 3.8 2023 0245     2023 0245    CO2 21 (L) 2023 0245    BUN 15 2023 0245    CREATININE 0.7 2023 0245     2023 0245        Component Value Date/Time    CALCIUM 6.4 (LL) 2023 0245    ALKPHOS 69 2023 0245    AST 13 2023 0245    ALT 10 2023 0245    BILITOT 0.1 2023 0245    ESTGFRAFRICA >60 10/01/2020 1007    EGFRNONAA >60 10/01/2020 1007            Lab Results   Component Value Date    WBC 15.70 (H) 2023    HGB 9.5 (L) 2023    HCT 29.5 (L) 2023    MCV 79 (L) 2023     2023       No results for input(s): "PT", "INR", "PROTIME", "APTT" in the last 72 hours.                                                                                                                     2023  Steve Sharma is a 27 y.o., female.      Pre-op Assessment    I have reviewed the Patient Summary Reports.     I have reviewed the Nursing Notes.    I have reviewed the Medications.     Review of Systems  Anesthesia Hx:  No problems with previous Anesthesia  History of prior surgery of interest to airway management or planning: Denies Family Hx of Anesthesia complications.   Denies Personal Hx of Anesthesia complications.   Hematology/Oncology:     Oncology Normal     Cardiovascular:   Hypertension  Denies Angina. ECG has been reviewed.    Pulmonary:   Denies Shortness of breath.  Denies Recent URI.    Renal/:  Renal/ Normal     Hepatic/GI:   Denies GERD. Denies Liver Disease.  "   Neurological:   Denies CVA. Denies Seizures.    Endocrine:  Endocrine Normal Denies Diabetes.        Physical Exam  General: Well nourished, Cooperative, Alert and Oriented    Airway:  Mallampati: II   Mouth Opening: Normal  TM Distance: Normal  Tongue: Normal  Neck ROM: Normal ROM    Dental:  Intact        Anesthesia Plan  Type of Anesthesia, risks & benefits discussed:    Anesthesia Type: Gen ETT, Epidural, Spinal, CSE  Intra-op Monitoring Plan: Standard ASA Monitors  Post Op Pain Control Plan: multimodal analgesia, IV/PO Opioids PRN and epidural analgesia  Informed Consent: Informed consent signed with the Patient and all parties understand the risks and agree with anesthesia plan.  All questions answered.   ASA Score: 3  Day of Surgery Review of History & Physical: H&P Update referred to the surgeon/provider.    Ready For Surgery From Anesthesia Perspective.     .

## 2023-09-24 NOTE — HPI
27 F, 25 w 3d, , transferred from outside hospital 2 for preeclampsia management.    Patient presented to OSH with bilateral lower extremity edema and was subsequently worked up for preeclampsia. Asymptomatic for other signs of pre-eclampsia, including headache, vision changes, shortness of breath, RUQ pain. CBC, CMP wnl. P/Cr: 0.08 . 24 hour urine protein 3564mg. Patient was diagnosed with Pre-Eclampsia. She received 4 doses of Hydralazine (last dose was 23 at 1940). She was also maintained on magnesium sulfate until  for neuroprotection and for seizure prophylaxis. Pt has also completed course of betamethasone on 23 and 23.  This pregnancy has been complicated by previous  section x 2, prior  delivery, obesity, anemia, and now pre-eclampsia.    OBHx  G1  at term  G2 pLTCS at 35w4d for PreE w/ SF (BP, HA) as per patient  G3 rLTCS at term    Medical history:  Denies any other pertinent medical history.   Patient states she has not been on any hypertensive agents throughout her pregnancies or in between. She is currently asymptomatic.

## 2023-09-24 NOTE — PROGRESS NOTES
09/24/23 0243   Vital Signs   BP (!) 155/95   MAP (mmHg) 120   Pulse 72   SpO2 98 %   Resp 20   Temp 99 °F (37.2 °C)     Patient arrived with blood pressure of 170/90mmHg. MD notified and rechecked it after 15 minutes. Patient is asymptomatic with no vaginal spotting, contraction, headache and blurring of vision noted. With bilateral leg edema noted yet, patient able to ambulate without pain.

## 2023-09-24 NOTE — SUBJECTIVE & OBJECTIVE
Obstetric HPI:  Patient reports None contractions, active fetal movement, No vaginal bleeding , No loss of fluid     OB History    Para Term  AB Living   4 3 2 1 0 3   SAB IAB Ectopic Multiple Live Births   0 0 0 0 3      # Outcome Date GA Lbr Edward/2nd Weight Sex Delivery Anes PTL Lv   4 Current            3 Term 10/01/20 39w0d  2.975 kg (6 lb 8.9 oz) F CS-LTranv Spinal  MISSAEL      Name: SHADIA BENTON      Apgar1: 8  Apgar5: 9   2  19 35w4d  2.32 kg (5 lb 1.8 oz) F CS-LTranv EPI  MISSAEL      Complications: Fetal Intolerance, Failure to Progress in First Stage      Name: SHADIA BENTON      Apgar1: 8  Apgar5: 8   1 Term     F Vag-Spont EPI N MISSAEL     Past Medical History:   Diagnosis Date    Anemia     Hypertension     AFTER FIRST CHILD    Pregnancy          Past Surgical History:   Procedure Laterality Date     SECTION N/A 2019    Procedure:  SECTION;  Surgeon: Kryee Liu MD;  Location: [facility] OR;  Service: OB/GYN;  Laterality: N/A;     SECTION N/A 10/1/2020    Procedure:  SECTION;  Surgeon: Kyree Liu MD;  Location: [facility] OR;  Service: OB/GYN;  Laterality: N/A;    VAGINAL DELIVERY         PTA Medications   Medication Sig    aspirin (ECOTRIN) 81 MG EC tablet Take 81 mg by mouth once daily.    prenatal vits62/FA/om3/dha/epa (PRENATAL GUMMY ORAL) Take 2 tablets by mouth once daily.       Review of patient's allergies indicates:   Allergen Reactions    Biaxin [clarithromycin]      Pt states when she was young and unsure of type of reaction        Family History       Problem Relation (Age of Onset)    Hypertension Mother          Tobacco Use    Smoking status: Never    Smokeless tobacco: Never   Substance and Sexual Activity    Alcohol use: No    Drug use: No    Sexual activity: Yes     Review of Systems   Constitutional:  Negative for appetite change, diaphoresis, fever and unexpected weight change.   Eyes:  Negative for visual  disturbance.   Respiratory:  Negative for cough.    Cardiovascular:  Positive for leg swelling. Negative for chest pain.   Gastrointestinal:  Negative for abdominal pain, constipation, diarrhea, nausea and vomiting.   Genitourinary:  Negative for vaginal bleeding, vaginal discharge, vaginal pain, vaginal dryness and vaginal odor.   Musculoskeletal:  Negative for arthralgias and back pain.   Neurological:  Negative for headaches.   Psychiatric/Behavioral:  The patient is not nervous/anxious.       Objective:     Vital Signs (Most Recent):    Vital Signs (24h Range):  Temp:  [97.5 °F (36.4 °C)-98.2 °F (36.8 °C)] 98.2 °F (36.8 °C)  Pulse:  [71-96] 91  Resp:  [18] 18  SpO2:  [97 %] 97 %  BP: (131-179)/(82-97) 152/85        There is no height or weight on file to calculate BMI.    FHT: 135 bpm, mod variability, + accels, - decels; overall reassuring and appropriate for gestational age   TOCO:  None      Physical Exam:   Constitutional: She is oriented to person, place, and time. She appears well-developed and well-nourished.    HENT:   Head: Normocephalic.    Eyes: Pupils are equal, round, and reactive to light.     Cardiovascular:  Normal rate.             Pulmonary/Chest: Effort normal. No respiratory distress.        Abdominal: Soft. Bowel sounds are normal. There is no abdominal tenderness. There is no rebound and no guarding.     Genitourinary:    Vagina normal.   No  no vaginal discharge in the vagina.           Musculoskeletal: Normal range of motion.       Neurological: She is alert and oriented to person, place, and time.    Skin: Skin is warm and dry.    Psychiatric: She has a normal mood and affect. Her behavior is normal. Judgment and thought content normal.        Cervix: Deferred     Significant Labs:  Lab Results   Component Value Date    [facility] B POS 10/01/2020     Recent Labs   Lab 09/23/23  0415 09/24/23  0245   WBC 15.20* 15.70*   HGB 9.5* 9.5*   HCT 29.6* 29.5*   MCV 79* 79*    401        Recent Labs   Lab 09/23/23  0415 09/23/23  0859 09/24/23  0245   *  --  135*   K 3.8  --  3.8     --  107   CO2 19*  --  21*   BUN 13  --  15   CREATININE 0.58*  --  0.7   *  --  109   PROT 5.9*  --  5.4*   BILITOT 0.3  --  0.1   ALKPHOS 81  --  69   ALT 18  --  10   AST 26  --  13   MG  --  7.5*  --        24H U: 3564 (OSH)

## 2023-09-24 NOTE — ASSESSMENT & PLAN NOTE
- Primary OB: Andre  - Delivery consents, including vaginal, operative, and , and blood consents reviewed and signed at time of admission.   - Presentation: vtx; If moving forward with delivery, will rescan to determine fetal pesentation   - Growth Ultrasound: will perform at [facility] and order formal during weekday   - Diet: Regular  - Monitoring: NST/TOCO BID  - Labs: UTD  - Aneuploidy Screening: Not documented   - 1hr GTT: Not completed; Will perform once out of steroid affect   - GBS: unknown; Will collect if moving towards delivery   - BMZ course completed at OSH; Can receive rescue BMZ as soon as  if moving towards delivery     - Magnesium to be administered (6+2) for fetal neuroprotection if delivery indicated before 32w0d. If > 32w0d, administer (4+2) for maternal seizure prophylaxis.    - Tdap: Needs between 27-36 weeks   - Continue PNV  - Contraception: Undecided; Will continue to discuss

## 2023-09-24 NOTE — ASSESSMENT & PLAN NOTE
- At OSH, required multiple acute anti-hypertensive medications, received BMZ, and was previously on magnesium for seizure prophylaxis. Patient has since been de-escalated and transferred to [facility] for higher level of care.   - Asymptomatic for PreE s/s   - Current Regimen:    - Procardia XL 30 mg (09/24)   - VSS, one non-sustained blood pressure with remainder mild range   - Labs, Will continue to trend q 72hrs (next 09/27):    - PCR 0.08, 24hrU 3564    - Plts 401    - Cr 0.7    - AST/ALT 13/10   - EKG pending  - Will continue to trend and continue inpatient management until delivery at 34w0d or if maternal/fetal indication sooner   - Will escalate care if s/s PreE with magnesium

## 2023-09-24 NOTE — PLAN OF CARE
Problem: Hypertensive Disorders in Pregnancy  Goal: Maternal-Fetal Stabilization  Outcome: Ongoing, Progressing     Problem: Adult Inpatient Plan of Care  Goal: Readiness for Transition of Care  Outcome: Ongoing, Progressing     Problem:  Fall Injury Risk  Goal: Absence of Fall, Infant Drop and Related Injury  Outcome: Ongoing, Progressing     Problem: Hypertensive Disorders in Pregnancy  Goal: Maternal-Fetal Stabilization  Outcome: Ongoing, Progressing

## 2023-09-25 PROBLEM — O99.012 ANEMIA DURING PREGNANCY IN SECOND TRIMESTER: Status: ACTIVE | Noted: 2023-09-25

## 2023-09-25 LAB — RPR SER QL: NORMAL

## 2023-09-25 PROCEDURE — A4216 STERILE WATER/SALINE, 10 ML: HCPCS

## 2023-09-25 PROCEDURE — 99233 PR SUBSEQUENT HOSPITAL CARE,LEVL III: ICD-10-PCS | Mod: 25,,, | Performed by: OBSTETRICS & GYNECOLOGY

## 2023-09-25 PROCEDURE — 59025 FETAL NON-STRESS TEST: CPT | Mod: 26,,, | Performed by: OBSTETRICS & GYNECOLOGY

## 2023-09-25 PROCEDURE — 76816 OB US FOLLOW-UP PER FETUS: CPT | Mod: 26,,, | Performed by: OBSTETRICS & GYNECOLOGY

## 2023-09-25 PROCEDURE — 63600175 PHARM REV CODE 636 W HCPCS

## 2023-09-25 PROCEDURE — 76815 OB US LIMITED FETUS(S): CPT

## 2023-09-25 PROCEDURE — 25000003 PHARM REV CODE 250

## 2023-09-25 PROCEDURE — 11000001 HC ACUTE MED/SURG PRIVATE ROOM

## 2023-09-25 PROCEDURE — 99233 SBSQ HOSP IP/OBS HIGH 50: CPT | Mod: 25,,, | Performed by: OBSTETRICS & GYNECOLOGY

## 2023-09-25 PROCEDURE — 59025 PR FETAL 2N-STRESS TEST: ICD-10-PCS | Mod: 26,,, | Performed by: OBSTETRICS & GYNECOLOGY

## 2023-09-25 PROCEDURE — 76816 US MFM PROCEDURE (VIEWPOINT): ICD-10-PCS | Mod: 26,,, | Performed by: OBSTETRICS & GYNECOLOGY

## 2023-09-25 PROCEDURE — 25000003 PHARM REV CODE 250: Performed by: STUDENT IN AN ORGANIZED HEALTH CARE EDUCATION/TRAINING PROGRAM

## 2023-09-25 RX ORDER — NIFEDIPINE 30 MG/1
30 TABLET, EXTENDED RELEASE ORAL ONCE
Status: COMPLETED | OUTPATIENT
Start: 2023-09-25 | End: 2023-09-25

## 2023-09-25 RX ORDER — ENOXAPARIN SODIUM 100 MG/ML
40 INJECTION SUBCUTANEOUS EVERY 24 HOURS
Status: DISCONTINUED | OUTPATIENT
Start: 2023-09-25 | End: 2023-09-26

## 2023-09-25 RX ORDER — NIFEDIPINE 30 MG/1
60 TABLET, EXTENDED RELEASE ORAL DAILY
Status: DISCONTINUED | OUTPATIENT
Start: 2023-09-26 | End: 2023-09-26

## 2023-09-25 RX ADMIN — DOCUSATE SODIUM 100 MG: 100 CAPSULE, LIQUID FILLED ORAL at 09:09

## 2023-09-25 RX ADMIN — Medication 10 ML: at 08:09

## 2023-09-25 RX ADMIN — ENOXAPARIN SODIUM 40 MG: 40 INJECTION SUBCUTANEOUS at 05:09

## 2023-09-25 RX ADMIN — ASPIRIN 81 MG: 81 TABLET, COATED ORAL at 09:09

## 2023-09-25 RX ADMIN — PRENATAL VIT W/ FE FUMARATE-FA TAB 27-0.8 MG 1 TABLET: 27-0.8 TAB at 09:09

## 2023-09-25 RX ADMIN — FERROUS SULFATE TAB 325 MG (65 MG ELEMENTAL FE) 1 EACH: 325 (65 FE) TAB at 09:09

## 2023-09-25 RX ADMIN — NIFEDIPINE 30 MG: 30 TABLET, FILM COATED, EXTENDED RELEASE ORAL at 09:09

## 2023-09-25 RX ADMIN — NIFEDIPINE 30 MG: 30 TABLET, FILM COATED, EXTENDED RELEASE ORAL at 04:09

## 2023-09-25 NOTE — ASSESSMENT & PLAN NOTE
- Primary OB: Adnre  - Delivery consents, including vaginal, operative, and , and blood consents reviewed and signed at time of admission.   - Presentation: vtx; If moving forward with delivery, will rescan to determine fetal pesentation   - Growth Ultrasound: 802g 38th%; Will repeat growth 10/16   - Diet: Regular  - Monitoring: NST/TOCO BID  - Labs: UTD  - Aneuploidy Screening: Not documented   - 1hr GTT: Not completed; Will perform once out of steroid affect   - GBS: unknown; Will collect if moving towards delivery   - BMZ course completed at OSH; Can receive rescue BMZ as soon as  if moving towards delivery     - Magnesium to be administered (6+2) for fetal neuroprotection if delivery indicated before 32w0d. If > 32w0d, administer (4+2) for maternal seizure prophylaxis.    - Tdap: Needs between 27-36 weeks   - Continue PNV  - Contraception: Undecided; Will continue to discuss

## 2023-09-25 NOTE — PLAN OF CARE
Problem: Adult Inpatient Plan of Care  Goal: Plan of Care Review  Outcome: Ongoing, Progressing     Problem:  Fall Injury Risk  Goal: Absence of Fall, Infant Drop and Related Injury  Outcome: Ongoing, Progressing     Problem: Hypertensive Disorders in Pregnancy  Goal: Maternal-Fetal Stabilization  Outcome: Ongoing, Progressing     Problem: Maternal-Fetal Wellbeing  Goal: Optimal Maternal-Fetal Wellbeing  Outcome: Ongoing, Progressing

## 2023-09-25 NOTE — ASSESSMENT & PLAN NOTE
- At OSH, required multiple acute anti-hypertensive medications, received BMZ, and was previously on magnesium for seizure prophylaxis. Patient has since been de-escalated and transferred to [facility] for higher level of care.   - Asymptomatic for PreE s/s   - Current Regimen:    - Procardia XL 30 mg (09/24)   - VSS, required hydral 5 yesterday    - Labs, Will continue to trend q 72hrs (next 09/27):    - PCR 3.74 (Baseline 0.08 on 09/30), 24hrU 3564    - Plts 401    - Cr 0.7    - AST/ALT 13/10   - EKG NSR  - Will continue to trend and continue inpatient management until delivery at 34w0d or if maternal/fetal indication sooner   - Will escalate care if s/s PreE worsening with magnesium

## 2023-09-25 NOTE — ASSESSMENT & PLAN NOTE
- Asymptomatic   - 9.5/30; MCV consistent with microcytic anemia   - Continue Fe/Colace   - Can consider Fe studies

## 2023-09-25 NOTE — ASSESSMENT & PLAN NOTE
- Primary OB: Andre  - Delivery consents, including vaginal, operative, and , and blood consents reviewed and signed at time of admission.   - Presentation: vtx; If moving forward with delivery, will rescan to determine fetal pesentation   - Growth Ultrasound: 784g on [facility]; Will perform formal growth today   - Diet: Regular  - Monitoring: NST/TOCO BID  - Labs: UTD  - Aneuploidy Screening: Not documented   - 1hr GTT: Not completed; Will perform once out of steroid affect   - GBS: unknown; Will collect if moving towards delivery   - BMZ course completed at OSH; Can receive rescue BMZ as soon as  if moving towards delivery     - Magnesium to be administered (6+2) for fetal neuroprotection if delivery indicated before 32w0d. If > 32w0d, administer (4+2) for maternal seizure prophylaxis.    - Tdap: Needs between 27-36 weeks   - Continue PNV  - Contraception: Undecided; Will continue to discuss

## 2023-09-25 NOTE — PLAN OF CARE
23 0753   OB SCREEN   Assessment Type Discharge Planning Brief Assessment   Source of Information health record   Received Prenatal Care Yes   Any indications/suspicions for None   Is this a teen pregnancy No   Is the baby in NICU No  (Will deliver at 34wks or sooner if medically indicated.  will be admitted to the NICU.)   Indication for adoption/Safe Haven No   Indication for DME/post-acute needs No   HIV (+) No   Any congenital  disorders No   Fetal demise/ death No     Pt admitted to Antepartum due to Pre-eclampsia. Per chart, plan is for pt to remain inpatient until delivery at 34 weeks. Sw team will follow for support.

## 2023-09-25 NOTE — PLAN OF CARE
Problem: Adult Inpatient Plan of Care  Goal: Plan of Care Review  Outcome: Ongoing, Progressing  Goal: Patient-Specific Goal (Individualized)  Outcome: Ongoing, Progressing  Goal: Absence of Hospital-Acquired Illness or Injury  Outcome: Ongoing, Progressing  Goal: Optimal Comfort and Wellbeing  Outcome: Ongoing, Progressing  Goal: Readiness for Transition of Care  Outcome: Ongoing, Progressing     Problem:  Fall Injury Risk  Goal: Absence of Fall, Infant Drop and Related Injury  Outcome: Ongoing, Progressing     Problem: Hypertensive Disorders in Pregnancy  Goal: Maternal-Fetal Stabilization  Outcome: Ongoing, Progressing

## 2023-09-25 NOTE — NURSING
0650 Received report from VALERI Clemente. On regular diet. VS every 4 hours. NST twice a day. SCD's on. Daily weights. CMP, type and screen, and CBC every 72 hours. S/P MgSo4 drip on 9/23. With 18 gauge at right forearm, saline-locked.    0811  BP of 167/90mmHg. Notified MD. Patient denied headache, blurring of vision, nausea, vomiting, RUQ pain.    1617 BP of 167/94mmHg. Notified MD. Patient denied headache, blurring of vision, nausea, vomiting, RUQ pain. Positioned patient on left-side lying position.

## 2023-09-25 NOTE — SUBJECTIVE & OBJECTIVE
Obstetric HPI:  Patient reports None contractions, active fetal movement, No vaginal bleeding , No loss of fluid     OB History    Para Term  AB Living   4 3 2 1 0 3   SAB IAB Ectopic Multiple Live Births   0 0 0 0 3      # Outcome Date GA Lbr Edward/2nd Weight Sex Delivery Anes PTL Lv   4 Current            3 Term 10/01/20 39w0d  2.975 kg (6 lb 8.9 oz) F CS-LTranv Spinal  MISSAEL      Name: SHADIA BENTON      Apgar1: 8  Apgar5: 9   2  19 35w4d  2.32 kg (5 lb 1.8 oz) F CS-LTranv EPI  MISSAEL      Complications: Fetal Intolerance, Failure to Progress in First Stage      Name: SHADIA EBNTON      Apgar1: 8  Apgar5: 8   1 Term     F Vag-Spont EPI N MISSAEL     Past Medical History:   Diagnosis Date    Anemia     Hypertension     AFTER FIRST CHILD    Pregnancy          Past Surgical History:   Procedure Laterality Date     SECTION N/A 2019    Procedure:  SECTION;  Surgeon: Kyree Liu MD;  Location: [facility] OR;  Service: OB/GYN;  Laterality: N/A;     SECTION N/A 10/1/2020    Procedure:  SECTION;  Surgeon: Kyree Liu MD;  Location: [facility] OR;  Service: OB/GYN;  Laterality: N/A;    VAGINAL DELIVERY         PTA Medications   Medication Sig    aspirin (ECOTRIN) 81 MG EC tablet Take 81 mg by mouth once daily.    prenatal vits62/FA/om3/dha/epa (PRENATAL GUMMY ORAL) Take 2 tablets by mouth once daily.       Review of patient's allergies indicates:   Allergen Reactions    Biaxin [clarithromycin]      Pt states when she was young and unsure of type of reaction        Family History       Problem Relation (Age of Onset)    Hypertension Mother          Tobacco Use    Smoking status: Never    Smokeless tobacco: Never   Substance and Sexual Activity    Alcohol use: No    Drug use: No    Sexual activity: Yes     Review of Systems   Constitutional:  Negative for appetite change, diaphoresis, fever and unexpected weight change.   Eyes:  Negative for visual  disturbance.   Respiratory:  Negative for cough.    Cardiovascular:  Positive for leg swelling. Negative for chest pain.   Gastrointestinal:  Negative for abdominal pain, constipation, diarrhea, nausea and vomiting.   Genitourinary:  Negative for vaginal bleeding, vaginal discharge, vaginal pain, vaginal dryness and vaginal odor.   Musculoskeletal:  Negative for arthralgias and back pain.   Neurological:  Negative for headaches.   Psychiatric/Behavioral:  The patient is not nervous/anxious.       Objective:     Vital Signs (Most Recent):  Temp: 98 °F (36.7 °C) (09/25/23 0508)  Pulse: 80 (09/25/23 0508)  Resp: 17 (09/25/23 0508)  BP: 137/86 (09/25/23 0508)  SpO2: 98 % (09/25/23 0508) Vital Signs (24h Range):  Temp:  [97.7 °F (36.5 °C)-98.2 °F (36.8 °C)] 98 °F (36.7 °C)  Pulse:  [] 80  Resp:  [17-20] 17  SpO2:  [95 %-98 %] 98 %  BP: (134-182)/() 137/86     Weight: 79.3 kg (174 lb 15 oz)  Body mass index is 33.05 kg/m².    FHT: 135 bpm, mod variability, + 1 accels, - decels; overall reassuring and appropriate for gestational age   TOCO:  None      Physical Exam:   Constitutional: She is oriented to person, place, and time. She appears well-developed and well-nourished.    HENT:   Head: Normocephalic.    Eyes: Pupils are equal, round, and reactive to light.     Cardiovascular:  Normal rate.             Pulmonary/Chest: Effort normal. No respiratory distress.        Abdominal: Soft. Bowel sounds are normal. There is no abdominal tenderness. There is no rebound and no guarding.     Genitourinary:    Vagina normal.   No  no vaginal discharge in the vagina.           Musculoskeletal: Normal range of motion.       Neurological: She is alert and oriented to person, place, and time.    Skin: Skin is warm and dry.    Psychiatric: She has a normal mood and affect. Her behavior is normal. Judgment and thought content normal.        Cervix: Deferred     Significant Labs:  Lab Results   Component Value Date    [facility]  B POS 10/01/2020     Recent Labs   Lab 09/23/23 0415 09/24/23  0245   WBC 15.20* 15.70*   HGB 9.5* 9.5*   HCT 29.6* 29.5*   MCV 79* 79*    401         Recent Labs   Lab 09/23/23 0415 09/23/23  0859 09/24/23  0245   *  --  135*   K 3.8  --  3.8     --  107   CO2 19*  --  21*   BUN 13  --  15   CREATININE 0.58*  --  0.7   *  --  109   PROT 5.9*  --  5.4*   BILITOT 0.3  --  0.1   ALKPHOS 81  --  69   ALT 18  --  10   AST 26  --  13   MG  --  7.5*  --          24H U: 3564 (OSH)   PCR 3.74

## 2023-09-25 NOTE — ASSESSMENT & PLAN NOTE
- PreE w/ SF diagnosed in the setting of new proteinuria and sustained severely elevated BP requiring multiple acute anti-hypertensives, has since stabilized.   - Asymptomatic for PreE s/s   - VSS, mild range; Will continue to monitor q 4hrs or PRN   - Labs; Continue q 72H unless otherwise indicated (last 09/24, next 09/27)    - PCR 3.74 (Baseline 0.08 on 09/30), 24hrU 3564    - Plts 401    - Cr 0.7    - AST/ALT 13/10   - Current Regimen:    - Procardia XL 60 mg (09/24)   - VTE PPx: Lovenox qD in the setting of nephrotic range proteinuria; Will need to be discontinued 12hrs prior to neuraxial anesthesia   - Will continue to monitor blood pressures closely and optimize with uptitration of medications as needed   - Will continue inpatient management at this time with ideal delivery at 34w0d unless otherwise indicated by maternal or fetal indications

## 2023-09-25 NOTE — PROGRESS NOTES
[facility] - Antepartum  Obstetrics  Antepartum Progress Note    Patient Name: Steve Sharma  MRN: [id number]  Admission Date: 2023  Hospital Length of Stay: 1 days  Attending Physician: Dave Burns MD  Primary Care Provider: Vivienne, Primary Doctor    Subjective:     Principal Problem:Severe pre-eclampsia in second trimester    HPI:  27 F, 25 w 3d, , transferred from outside hospital 2 for preeclampsia management.    Patient presented to OSH with bilateral lower extremity edema and was subsequently worked up for preeclampsia. Asymptomatic for other signs of pre-eclampsia, including headache, vision changes, shortness of breath, RUQ pain. CBC, CMP wnl. P/Cr: 0.08 . 24 hour urine protein 3564mg. Patient was diagnosed with Pre-Eclampsia. She received 4 doses of Hydralazine (last dose was 23 at 1940). She was also maintained on magnesium sulfate until  for neuroprotection and for seizure prophylaxis. Pt has also completed course of betamethasone on 23 and 23.  This pregnancy has been complicated by previous  section x 2, prior  delivery, obesity, anemia, and now pre-eclampsia.    OBHx  G1  at term  G2 pLTCS at 35w4d for PreE w/ SF (BP, HA) as per patient  G3 rLTCS at term    Medical history:  Denies any other pertinent medical history.   Patient states she has not been on any hypertensive agents throughout her pregnancies or in between. She is currently asymptomatic.       Hospital Course:  23: Transferred from OSH to [facility] for PreE w/ SF. Asymptomatic for PreE s/s. Initially severely elevated BP, followed by non-severe. All other vitals wnl. EFM/TOCO RR/ No CTX. Started on Procardia XL 30 mg. Will continue inpatient management.   2023: HD#2. NAEO. Asymptomatic for PreE s/s. VSS. Current anti-hypertensive regimen: Procardia XL 30mg. Will continue inpatient management until 34w0d unless maternal/fetal indication.    Obstetric HPI:  Patient reports None  contractions, active fetal movement, No vaginal bleeding , No loss of fluid     OB History    Para Term  AB Living   4 3 2 1 0 3   SAB IAB Ectopic Multiple Live Births   0 0 0 0 3      # Outcome Date GA Lbr Edward/2nd Weight Sex Delivery Anes PTL Lv   4 Current            3 Term 10/01/20 39w0d  2.975 kg (6 lb 8.9 oz) F CS-LTranv Spinal  MISSAEL      Name: SHADIA BENTON      Apgar1: 8  Apgar5: 9   2  19 35w4d  2.32 kg (5 lb 1.8 oz) F CS-LTranv EPI  MISSAEL      Complications: Fetal Intolerance, Failure to Progress in First Stage      Name: SHADIA BENTON      Apgar1: 8  Apgar5: 8   1 Term     F Vag-Spont EPI N MISSAEL     Past Medical History:   Diagnosis Date    Anemia     Hypertension     AFTER FIRST CHILD    Pregnancy          Past Surgical History:   Procedure Laterality Date     SECTION N/A 2019    Procedure:  SECTION;  Surgeon: Kyree Liu MD;  Location: [facility] OR;  Service: OB/GYN;  Laterality: N/A;     SECTION N/A 10/1/2020    Procedure:  SECTION;  Surgeon: Kyree Liu MD;  Location: [facility] OR;  Service: OB/GYN;  Laterality: N/A;    VAGINAL DELIVERY         PTA Medications   Medication Sig    aspirin (ECOTRIN) 81 MG EC tablet Take 81 mg by mouth once daily.    prenatal vits62/FA/om3/dha/epa (PRENATAL GUMMY ORAL) Take 2 tablets by mouth once daily.       Review of patient's allergies indicates:   Allergen Reactions    Biaxin [clarithromycin]      Pt states when she was young and unsure of type of reaction        Family History       Problem Relation (Age of Onset)    Hypertension Mother          Tobacco Use    Smoking status: Never    Smokeless tobacco: Never   Substance and Sexual Activity    Alcohol use: No    Drug use: No    Sexual activity: Yes     Review of Systems   Constitutional:  Negative for appetite change, diaphoresis, fever and unexpected weight change.   Eyes:  Negative for visual disturbance.   Respiratory:  Negative  for cough.    Cardiovascular:  Positive for leg swelling. Negative for chest pain.   Gastrointestinal:  Negative for abdominal pain, constipation, diarrhea, nausea and vomiting.   Genitourinary:  Negative for vaginal bleeding, vaginal discharge, vaginal pain, vaginal dryness and vaginal odor.   Musculoskeletal:  Negative for arthralgias and back pain.   Neurological:  Negative for headaches.   Psychiatric/Behavioral:  The patient is not nervous/anxious.       Objective:     Vital Signs (Most Recent):  Temp: 98 °F (36.7 °C) (09/25/23 0508)  Pulse: 80 (09/25/23 0508)  Resp: 17 (09/25/23 0508)  BP: 137/86 (09/25/23 0508)  SpO2: 98 % (09/25/23 0508) Vital Signs (24h Range):  Temp:  [97.7 °F (36.5 °C)-98.2 °F (36.8 °C)] 98 °F (36.7 °C)  Pulse:  [] 80  Resp:  [17-20] 17  SpO2:  [95 %-98 %] 98 %  BP: (134-182)/() 137/86     Weight: 79.3 kg (174 lb 15 oz)  Body mass index is 33.05 kg/m².    FHT: 135 bpm, mod variability, + 1 accels, - decels; overall reassuring and appropriate for gestational age   TOCO:  None      Physical Exam:   Constitutional: She is oriented to person, place, and time. She appears well-developed and well-nourished.    HENT:   Head: Normocephalic.    Eyes: Pupils are equal, round, and reactive to light.     Cardiovascular:  Normal rate.             Pulmonary/Chest: Effort normal. No respiratory distress.      Abdominal: Soft. Bowel sounds are normal. There is no abdominal tenderness. There is no rebound and no guarding.     Genitourinary:    Vagina normal.   No  no vaginal discharge in the vagina.           Musculoskeletal: Normal range of motion.       Neurological: She is alert and oriented to person, place, and time.    Skin: Skin is warm and dry.    Psychiatric: She has a normal mood and affect. Her behavior is normal. Judgment and thought content normal.     Cervix: Deferred     Significant Labs:  Lab Results   Component Value Date    [facility] POS 10/01/2020     Recent Labs   Lab  235 23  0245   WBC 15.20* 15.70*   HGB 9.5* 9.5*   HCT 29.6* 29.5*   MCV 79* 79*    401         Recent Labs   Lab 23  0859 23  0245   *  --  135*   K 3.8  --  3.8     --  107   CO2 19*  --  21*   BUN 13  --  15   CREATININE 0.58*  --  0.7   *  --  109   PROT 5.9*  --  5.4*   BILITOT 0.3  --  0.1   ALKPHOS 81  --  69   ALT 18  --  10   AST 26  --  13   MG  --  7.5*  --          24H U: 3564 (OSH)   PCR 3.74    Assessment/Plan:     27 y.o. female  at 25w5d for:    Anemia of pregnancy  - Asymptomatic   - .; MCV consistent with microcytic anemia   - Continue Fe/Colace   - Can consider Fe studies     25 weeks gestation of pregnancy  - Primary OB: Andre  - Delivery consents, including vaginal, operative, and , and blood consents reviewed and signed at time of admission.   - Presentation: vtx; If moving forward with delivery, will rescan to determine fetal pesentation   - Growth Ultrasound: 784g on BSUS; Will perform formal growth today   - Diet: Regular  - Monitoring: NST/TOCO BID  - Labs: UTD  - Aneuploidy Screening: Not documented   - 1hr GTT: Not completed; Will perform once out of steroid affect   - GBS: unknown; Will collect if moving towards delivery   - BMZ course completed at OSH; Can receive rescue BMZ as soon as  if moving towards delivery     - Magnesium to be administered (6+2) for fetal neuroprotection if delivery indicated before 32w0d. If > 32w0d, administer (4+2) for maternal seizure prophylaxis.    - Tdap: Needs between 27-36 weeks   - Continue PNV  - Contraception: Undecided; Will continue to discuss     History of  section  - History of C/S x2, low transverse   - Desires repeat  section     Severe pre-eclampsia in second trimester  - At OSH, required multiple acute anti-hypertensive medications, received BMZ, and was previously on magnesium for seizure prophylaxis. Patient has since been  de-escalated and transferred to [facility] for higher level of care.   - Asymptomatic for PreE s/s   - Current Regimen:    - Procardia XL 30 mg (09/24)   - VSS, required hydral 5 yesterday    - Labs, Will continue to trend q 72hrs (next 09/27):    - PCR 3.74 (Baseline 0.08 on 09/30), 24hrU 3564    - Plts 401    - Cr 0.7    - AST/ALT 13/10   - EKG NSR  - Will continue to trend and continue inpatient management until delivery at 34w0d or if maternal/fetal indication sooner   - Will escalate care if s/s PreE worsening with magnesium      Bhakti Obregon MD  Obstetrics  [facility] - Antepartum

## 2023-09-26 ENCOUNTER — ANESTHESIA EVENT (OUTPATIENT)
Dept: OBSTETRICS AND GYNECOLOGY | Facility: OTHER | Age: 28
End: 2023-09-26
Payer: MEDICAID

## 2023-09-26 ENCOUNTER — ANESTHESIA (OUTPATIENT)
Dept: OBSTETRICS AND GYNECOLOGY | Facility: OTHER | Age: 28
End: 2023-09-26
Payer: MEDICAID

## 2023-09-26 LAB
ABO + RH BLD: NORMAL
ALBUMIN SERPL BCP-MCNC: 2.5 G/DL (ref 3.5–5.2)
ALP SERPL-CCNC: 77 U/L (ref 55–135)
ALT SERPL W/O P-5'-P-CCNC: 10 U/L (ref 10–44)
ANION GAP SERPL CALC-SCNC: 9 MMOL/L (ref 8–16)
AST SERPL-CCNC: 15 U/L (ref 10–40)
BASOPHILS # BLD AUTO: 0.02 K/UL (ref 0–0.2)
BASOPHILS NFR BLD: 0.1 % (ref 0–1.9)
BILIRUB SERPL-MCNC: 0.2 MG/DL (ref 0.1–1)
BLD GP AB SCN CELLS X3 SERPL QL: NORMAL
BNP SERPL-MCNC: 220 PG/ML (ref 0–99)
BUN SERPL-MCNC: 21 MG/DL (ref 6–20)
CALCIUM SERPL-MCNC: 8.7 MG/DL (ref 8.7–10.5)
CHLORIDE SERPL-SCNC: 106 MMOL/L (ref 95–110)
CO2 SERPL-SCNC: 22 MMOL/L (ref 23–29)
CREAT SERPL-MCNC: 0.7 MG/DL (ref 0.5–1.4)
DIFFERENTIAL METHOD: ABNORMAL
EOSINOPHIL # BLD AUTO: 0.1 K/UL (ref 0–0.5)
EOSINOPHIL NFR BLD: 0.5 % (ref 0–8)
ERYTHROCYTE [DISTWIDTH] IN BLOOD BY AUTOMATED COUNT: 13.8 % (ref 11.5–14.5)
EST. GFR  (NO RACE VARIABLE): >60 ML/MIN/1.73 M^2
GLUCOSE SERPL-MCNC: 100 MG/DL (ref 70–110)
HCT VFR BLD AUTO: 32.1 % (ref 37–48.5)
HGB BLD-MCNC: 10.2 G/DL (ref 12–16)
IMM GRANULOCYTES # BLD AUTO: 0.11 K/UL (ref 0–0.04)
IMM GRANULOCYTES NFR BLD AUTO: 0.7 % (ref 0–0.5)
LYMPHOCYTES # BLD AUTO: 2.6 K/UL (ref 1–4.8)
LYMPHOCYTES NFR BLD: 17.7 % (ref 18–48)
MCH RBC QN AUTO: 25 PG (ref 27–31)
MCHC RBC AUTO-ENTMCNC: 31.8 G/DL (ref 32–36)
MCV RBC AUTO: 79 FL (ref 82–98)
MONOCYTES # BLD AUTO: 1.8 K/UL (ref 0.3–1)
MONOCYTES NFR BLD: 12.1 % (ref 4–15)
NEUTROPHILS # BLD AUTO: 10.1 K/UL (ref 1.8–7.7)
NEUTROPHILS NFR BLD: 68.9 % (ref 38–73)
NRBC BLD-RTO: 0 /100 WBC
PLATELET # BLD AUTO: 354 K/UL (ref 150–450)
PMV BLD AUTO: 8.7 FL (ref 9.2–12.9)
POTASSIUM SERPL-SCNC: 4.5 MMOL/L (ref 3.5–5.1)
PROT SERPL-MCNC: 5.5 G/DL (ref 6–8.4)
RBC # BLD AUTO: 4.08 M/UL (ref 4–5.4)
SODIUM SERPL-SCNC: 137 MMOL/L (ref 136–145)
SPECIMEN OUTDATE: NORMAL
TROPONIN I SERPL DL<=0.01 NG/ML-MCNC: 0.02 NG/ML (ref 0–0.03)
WBC # BLD AUTO: 14.74 K/UL (ref 3.9–12.7)

## 2023-09-26 PROCEDURE — 86920 COMPATIBILITY TEST SPIN: CPT | Performed by: STUDENT IN AN ORGANIZED HEALTH CARE EDUCATION/TRAINING PROGRAM

## 2023-09-26 PROCEDURE — 99233 PR SUBSEQUENT HOSPITAL CARE,LEVL III: ICD-10-PCS | Mod: 25,,, | Performed by: OBSTETRICS & GYNECOLOGY

## 2023-09-26 PROCEDURE — 63600175 PHARM REV CODE 636 W HCPCS

## 2023-09-26 PROCEDURE — 85025 COMPLETE CBC W/AUTO DIFF WBC: CPT | Performed by: STUDENT IN AN ORGANIZED HEALTH CARE EDUCATION/TRAINING PROGRAM

## 2023-09-26 PROCEDURE — 59025 FETAL NON-STRESS TEST: CPT | Mod: 26,,, | Performed by: OBSTETRICS & GYNECOLOGY

## 2023-09-26 PROCEDURE — 25000003 PHARM REV CODE 250: Performed by: STUDENT IN AN ORGANIZED HEALTH CARE EDUCATION/TRAINING PROGRAM

## 2023-09-26 PROCEDURE — 59025 PR FETAL 2N-STRESS TEST: ICD-10-PCS | Mod: 26,,, | Performed by: OBSTETRICS & GYNECOLOGY

## 2023-09-26 PROCEDURE — 63600175 PHARM REV CODE 636 W HCPCS: Performed by: STUDENT IN AN ORGANIZED HEALTH CARE EDUCATION/TRAINING PROGRAM

## 2023-09-26 PROCEDURE — 37000009 HC ANESTHESIA EA ADD 15 MINS: Performed by: OBSTETRICS & GYNECOLOGY

## 2023-09-26 PROCEDURE — 36004725 HC OB OR TIME LEV III - EA ADD 15 MIN: Performed by: OBSTETRICS & GYNECOLOGY

## 2023-09-26 PROCEDURE — 11000001 HC ACUTE MED/SURG PRIVATE ROOM

## 2023-09-26 PROCEDURE — C9113 INJ PANTOPRAZOLE SODIUM, VIA: HCPCS | Performed by: STUDENT IN AN ORGANIZED HEALTH CARE EDUCATION/TRAINING PROGRAM

## 2023-09-26 PROCEDURE — 37000008 HC ANESTHESIA 1ST 15 MINUTES: Performed by: OBSTETRICS & GYNECOLOGY

## 2023-09-26 PROCEDURE — 86901 BLOOD TYPING SEROLOGIC RH(D): CPT | Performed by: STUDENT IN AN ORGANIZED HEALTH CARE EDUCATION/TRAINING PROGRAM

## 2023-09-26 PROCEDURE — 71000039 HC RECOVERY, EACH ADD'L HOUR: Performed by: OBSTETRICS & GYNECOLOGY

## 2023-09-26 PROCEDURE — 36004724 HC OB OR TIME LEV III - 1ST 15 MIN: Performed by: OBSTETRICS & GYNECOLOGY

## 2023-09-26 PROCEDURE — 99233 SBSQ HOSP IP/OBS HIGH 50: CPT | Mod: 25,,, | Performed by: OBSTETRICS & GYNECOLOGY

## 2023-09-26 PROCEDURE — 84484 ASSAY OF TROPONIN QUANT: CPT | Performed by: STUDENT IN AN ORGANIZED HEALTH CARE EDUCATION/TRAINING PROGRAM

## 2023-09-26 PROCEDURE — 59514 PRA REAN DELIVERY ONLY: ICD-10-PCS | Mod: AA,,, | Performed by: ANESTHESIOLOGY

## 2023-09-26 PROCEDURE — 27201040 HC RC 50 FILTER: Performed by: STUDENT IN AN ORGANIZED HEALTH CARE EDUCATION/TRAINING PROGRAM

## 2023-09-26 PROCEDURE — 83880 ASSAY OF NATRIURETIC PEPTIDE: CPT | Performed by: STUDENT IN AN ORGANIZED HEALTH CARE EDUCATION/TRAINING PROGRAM

## 2023-09-26 PROCEDURE — 80053 COMPREHEN METABOLIC PANEL: CPT | Performed by: STUDENT IN AN ORGANIZED HEALTH CARE EDUCATION/TRAINING PROGRAM

## 2023-09-26 PROCEDURE — 59514 CESAREAN DELIVERY ONLY: CPT | Mod: AT,,, | Performed by: OBSTETRICS & GYNECOLOGY

## 2023-09-26 PROCEDURE — 71000033 HC RECOVERY, INTIAL HOUR: Performed by: OBSTETRICS & GYNECOLOGY

## 2023-09-26 PROCEDURE — 25000003 PHARM REV CODE 250: Performed by: OBSTETRICS & GYNECOLOGY

## 2023-09-26 PROCEDURE — 36415 COLL VENOUS BLD VENIPUNCTURE: CPT | Performed by: STUDENT IN AN ORGANIZED HEALTH CARE EDUCATION/TRAINING PROGRAM

## 2023-09-26 PROCEDURE — 59514 CESAREAN DELIVERY ONLY: CPT | Mod: AA,,, | Performed by: ANESTHESIOLOGY

## 2023-09-26 PROCEDURE — 59514 PR CESAREAN DELIVERY ONLY: ICD-10-PCS | Mod: AT,,, | Performed by: OBSTETRICS & GYNECOLOGY

## 2023-09-26 PROCEDURE — 25000003 PHARM REV CODE 250

## 2023-09-26 RX ORDER — HYDRALAZINE HYDROCHLORIDE 20 MG/ML
5 INJECTION INTRAMUSCULAR; INTRAVENOUS ONCE
Status: COMPLETED | OUTPATIENT
Start: 2023-09-26 | End: 2023-09-26

## 2023-09-26 RX ORDER — MAGNESIUM SULFATE HEPTAHYDRATE 40 MG/ML
6 INJECTION, SOLUTION INTRAVENOUS ONCE
Status: COMPLETED | OUTPATIENT
Start: 2023-09-26 | End: 2023-09-26

## 2023-09-26 RX ORDER — FAMOTIDINE 10 MG/ML
20 INJECTION INTRAVENOUS
Status: CANCELLED | OUTPATIENT
Start: 2023-09-26

## 2023-09-26 RX ORDER — LABETALOL HYDROCHLORIDE 5 MG/ML
20 INJECTION, SOLUTION INTRAVENOUS ONCE
Status: COMPLETED | OUTPATIENT
Start: 2023-09-26 | End: 2023-09-26

## 2023-09-26 RX ORDER — LABETALOL HYDROCHLORIDE 5 MG/ML
40 INJECTION, SOLUTION INTRAVENOUS ONCE
Status: COMPLETED | OUTPATIENT
Start: 2023-09-26 | End: 2023-09-26

## 2023-09-26 RX ORDER — SODIUM CHLORIDE, SODIUM LACTATE, POTASSIUM CHLORIDE, CALCIUM CHLORIDE 600; 310; 30; 20 MG/100ML; MG/100ML; MG/100ML; MG/100ML
INJECTION, SOLUTION INTRAVENOUS CONTINUOUS
Status: DISCONTINUED | OUTPATIENT
Start: 2023-09-26 | End: 2023-09-28

## 2023-09-26 RX ORDER — NIFEDIPINE 30 MG/1
60 TABLET, EXTENDED RELEASE ORAL DAILY
Status: DISCONTINUED | OUTPATIENT
Start: 2023-09-27 | End: 2023-09-29 | Stop reason: HOSPADM

## 2023-09-26 RX ORDER — PANTOPRAZOLE SODIUM 40 MG/10ML
40 INJECTION, POWDER, LYOPHILIZED, FOR SOLUTION INTRAVENOUS DAILY
Status: DISCONTINUED | OUTPATIENT
Start: 2023-09-26 | End: 2023-09-29 | Stop reason: HOSPADM

## 2023-09-26 RX ORDER — METOCLOPRAMIDE HYDROCHLORIDE 5 MG/ML
10 INJECTION INTRAMUSCULAR; INTRAVENOUS ONCE
Status: COMPLETED | OUTPATIENT
Start: 2023-09-26 | End: 2023-09-26

## 2023-09-26 RX ORDER — SODIUM CITRATE AND CITRIC ACID MONOHYDRATE 334; 500 MG/5ML; MG/5ML
30 SOLUTION ORAL ONCE
Status: COMPLETED | OUTPATIENT
Start: 2023-09-26 | End: 2023-09-26

## 2023-09-26 RX ORDER — HYDROCODONE BITARTRATE AND ACETAMINOPHEN 500; 5 MG/1; MG/1
TABLET ORAL
Status: DISCONTINUED | OUTPATIENT
Start: 2023-09-27 | End: 2023-09-27

## 2023-09-26 RX ORDER — ACETAMINOPHEN 500 MG
1000 TABLET ORAL ONCE
Status: COMPLETED | OUTPATIENT
Start: 2023-09-26 | End: 2023-09-26

## 2023-09-26 RX ORDER — MAGNESIUM SULFATE HEPTAHYDRATE 40 MG/ML
2 INJECTION, SOLUTION INTRAVENOUS CONTINUOUS
Status: DISCONTINUED | OUTPATIENT
Start: 2023-09-26 | End: 2023-09-28

## 2023-09-26 RX ORDER — LABETALOL HYDROCHLORIDE 5 MG/ML
INJECTION, SOLUTION INTRAVENOUS
Status: COMPLETED
Start: 2023-09-26 | End: 2023-09-26

## 2023-09-26 RX ORDER — SODIUM CITRATE AND CITRIC ACID MONOHYDRATE 334; 500 MG/5ML; MG/5ML
30 SOLUTION ORAL
Status: CANCELLED | OUTPATIENT
Start: 2023-09-26

## 2023-09-26 RX ORDER — OXYTOCIN/RINGER'S LACTATE 30/500 ML
95 PLASTIC BAG, INJECTION (ML) INTRAVENOUS CONTINUOUS
Status: DISCONTINUED | OUTPATIENT
Start: 2023-09-27 | End: 2023-09-29 | Stop reason: HOSPADM

## 2023-09-26 RX ORDER — LABETALOL 200 MG/1
200 TABLET, FILM COATED ORAL EVERY 12 HOURS
Status: DISCONTINUED | OUTPATIENT
Start: 2023-09-26 | End: 2023-09-29 | Stop reason: HOSPADM

## 2023-09-26 RX ORDER — NIFEDIPINE 30 MG/1
30 TABLET, EXTENDED RELEASE ORAL DAILY
Status: DISCONTINUED | OUTPATIENT
Start: 2023-09-26 | End: 2023-09-26

## 2023-09-26 RX ORDER — CALCIUM GLUCONATE 98 MG/ML
1 INJECTION, SOLUTION INTRAVENOUS
Status: DISCONTINUED | OUTPATIENT
Start: 2023-09-26 | End: 2023-09-28

## 2023-09-26 RX ORDER — MAG HYDROX/ALUMINUM HYD/SIMETH 200-200-20
30 SUSPENSION, ORAL (FINAL DOSE FORM) ORAL ONCE
Status: COMPLETED | OUTPATIENT
Start: 2023-09-26 | End: 2023-09-26

## 2023-09-26 RX ORDER — NIFEDIPINE 30 MG/1
30 TABLET, EXTENDED RELEASE ORAL NIGHTLY
Status: DISCONTINUED | OUTPATIENT
Start: 2023-09-27 | End: 2023-09-29 | Stop reason: HOSPADM

## 2023-09-26 RX ORDER — FAMOTIDINE 10 MG/ML
20 INJECTION INTRAVENOUS
Status: COMPLETED | OUTPATIENT
Start: 2023-09-26 | End: 2023-09-26

## 2023-09-26 RX ORDER — DIPHENHYDRAMINE HCL 25 MG
25 CAPSULE ORAL ONCE
Status: COMPLETED | OUTPATIENT
Start: 2023-09-26 | End: 2023-09-26

## 2023-09-26 RX ORDER — FUROSEMIDE 10 MG/ML
40 INJECTION INTRAMUSCULAR; INTRAVENOUS ONCE
Status: COMPLETED | OUTPATIENT
Start: 2023-09-26 | End: 2023-09-26

## 2023-09-26 RX ORDER — NIFEDIPINE 30 MG/1
90 TABLET, EXTENDED RELEASE ORAL DAILY
Status: DISCONTINUED | OUTPATIENT
Start: 2023-09-27 | End: 2023-09-26

## 2023-09-26 RX ADMIN — PROPOFOL 200 MG: 10 INJECTION, EMULSION INTRAVENOUS at 11:09

## 2023-09-26 RX ADMIN — METOCLOPRAMIDE 10 MG: 5 INJECTION, SOLUTION INTRAMUSCULAR; INTRAVENOUS at 09:09

## 2023-09-26 RX ADMIN — LABETALOL HYDROCHLORIDE 200 MG: 200 TABLET, FILM COATED ORAL at 10:09

## 2023-09-26 RX ADMIN — FERROUS SULFATE TAB 325 MG (65 MG ELEMENTAL FE) 1 EACH: 325 (65 FE) TAB at 09:09

## 2023-09-26 RX ADMIN — ENOXAPARIN SODIUM 40 MG: 40 INJECTION SUBCUTANEOUS at 05:09

## 2023-09-26 RX ADMIN — ACETAMINOPHEN 1000 MG: 500 TABLET ORAL at 07:09

## 2023-09-26 RX ADMIN — ASPIRIN 81 MG: 81 TABLET, COATED ORAL at 09:09

## 2023-09-26 RX ADMIN — DOCUSATE SODIUM 100 MG: 100 CAPSULE, LIQUID FILLED ORAL at 09:09

## 2023-09-26 RX ADMIN — SODIUM CHLORIDE, POTASSIUM CHLORIDE, SODIUM LACTATE AND CALCIUM CHLORIDE: 600; 310; 30; 20 INJECTION, SOLUTION INTRAVENOUS at 11:09

## 2023-09-26 RX ADMIN — LIDOCAINE HYDROCHLORIDE 50 MG: 20 INJECTION, SOLUTION EPIDURAL; INFILTRATION; INTRACAUDAL; PERINEURAL at 11:09

## 2023-09-26 RX ADMIN — LABETALOL HYDROCHLORIDE 20 MG: 5 INJECTION INTRAVENOUS at 08:09

## 2023-09-26 RX ADMIN — LABETALOL HYDROCHLORIDE 40 MG: 5 INJECTION INTRAVENOUS at 01:09

## 2023-09-26 RX ADMIN — ALUMINUM HYDROXIDE, MAGNESIUM HYDROXIDE, AND SIMETHICONE 30 ML: 200; 200; 20 SUSPENSION ORAL at 08:09

## 2023-09-26 RX ADMIN — LABETALOL HYDROCHLORIDE 20 MG: 5 INJECTION INTRAVENOUS at 12:09

## 2023-09-26 RX ADMIN — FUROSEMIDE 40 MG: 10 INJECTION, SOLUTION INTRAMUSCULAR; INTRAVENOUS at 11:09

## 2023-09-26 RX ADMIN — PRENATAL VIT W/ FE FUMARATE-FA TAB 27-0.8 MG 1 TABLET: 27-0.8 TAB at 09:09

## 2023-09-26 RX ADMIN — SODIUM CITRATE AND CITRIC ACID MONOHYDRATE 30 ML: 500; 334 SOLUTION ORAL at 11:09

## 2023-09-26 RX ADMIN — PANTOPRAZOLE SODIUM 40 MG: 40 INJECTION, POWDER, LYOPHILIZED, FOR SOLUTION INTRAVENOUS at 07:09

## 2023-09-26 RX ADMIN — DIPHENHYDRAMINE HYDROCHLORIDE 25 MG: 25 CAPSULE ORAL at 09:09

## 2023-09-26 RX ADMIN — NIFEDIPINE 30 MG: 30 TABLET, FILM COATED, EXTENDED RELEASE ORAL at 12:09

## 2023-09-26 RX ADMIN — Medication 140 MG: at 11:09

## 2023-09-26 RX ADMIN — FAMOTIDINE 20 MG: 10 INJECTION, SOLUTION INTRAVENOUS at 11:09

## 2023-09-26 RX ADMIN — LABETALOL HYDROCHLORIDE 20 MG: 5 INJECTION, SOLUTION INTRAVENOUS at 12:09

## 2023-09-26 RX ADMIN — HYDRALAZINE HYDROCHLORIDE 5 MG: 20 INJECTION INTRAMUSCULAR; INTRAVENOUS at 10:09

## 2023-09-26 RX ADMIN — MAGNESIUM SULFATE HEPTAHYDRATE 2 G/HR: 40 INJECTION, SOLUTION INTRAVENOUS at 11:09

## 2023-09-26 RX ADMIN — CEFAZOLIN 2 G: 2 INJECTION, POWDER, FOR SOLUTION INTRAMUSCULAR; INTRAVENOUS at 11:09

## 2023-09-26 RX ADMIN — NIFEDIPINE 60 MG: 30 TABLET, FILM COATED, EXTENDED RELEASE ORAL at 09:09

## 2023-09-26 RX ADMIN — MAGNESIUM SULFATE HEPTAHYDRATE 6 G: 40 INJECTION, SOLUTION INTRAVENOUS at 11:09

## 2023-09-26 NOTE — PROGRESS NOTES
[facility] - Antepartum  Obstetrics  Antepartum Progress Note    Patient Name: Steve Sharma  MRN: [id number]  Admission Date: 2023  Hospital Length of Stay: 2 days  Attending Physician: Dave Burns MD  Primary Care Provider: Vivienne, Primary Doctor    Subjective:     Principal Problem:Severe pre-eclampsia in second trimester    HPI:  27 F, 25 w 3d, , transferred from outside hospital 2 for preeclampsia management.    Patient presented to OSH with bilateral lower extremity edema and was subsequently worked up for preeclampsia. Asymptomatic for other signs of pre-eclampsia, including headache, vision changes, shortness of breath, RUQ pain. CBC, CMP wnl. P/Cr: 0.08 . 24 hour urine protein 3564mg. Patient was diagnosed with Pre-Eclampsia. She received 4 doses of Hydralazine (last dose was 23 at 1940). She was also maintained on magnesium sulfate until  for neuroprotection and for seizure prophylaxis. Pt has also completed course of betamethasone on 23 and 23.  This pregnancy has been complicated by previous  section x 2, prior  delivery, obesity, anemia, and now pre-eclampsia.    OBHx  G1  at term  G2 pLTCS at 35w4d for PreE w/ SF (BP, HA) as per patient  G3 rLTCS at term    Medical history:  Denies any other pertinent medical history.   Patient states she has not been on any hypertensive agents throughout her pregnancies or in between. She is currently asymptomatic.     Hospital Course:  23: Transferred from OSH to [facility] for PreE w/ SF. Asymptomatic for PreE s/s. Initially severely elevated BP, followed by non-severe. All other vitals wnl. EFM/TOCO RR/ No CTX. Started on Procardia XL 30 mg. Will continue inpatient management.   2023: HD#2. NAEO. Asymptomatic for PreE s/s. VSS. Current anti-hypertensive regimen: Procardia XL 30mg. Will continue inpatient management until 34w0d unless maternal/fetal indication.  2023: HD#3. NAEO. Asymptomatic for PreE s/s.  VSS, BP mild range. Current anti-hypertensive regimen: Procardia XL 60mg qD. Will continue inpatient management until 34w0d unless maternal/fetal indication.     Obstetric HPI:  Patient reports None contractions, active fetal movement, No vaginal bleeding , No loss of fluid     Denies headache, scotoma, shortness of breath, RUQ pain, or worsening edema.  OB History    Para Term  AB Living   4 3 2 1 0 3   SAB IAB Ectopic Multiple Live Births   0 0 0 0 3      # Outcome Date GA Lbr Edward/2nd Weight Sex Delivery Anes PTL Lv   4 Current            3 Term 10/01/20 39w0d  2.975 kg (6 lb 8.9 oz) F CS-LTranv Spinal  MISSAEL      Name: SHADIA BENTON TAMERE      Apgar1: 8  Apgar5: 9   2  19 35w4d  2.32 kg (5 lb 1.8 oz) F CS-LTranv EPI  MISSAEL      Complications: Fetal Intolerance, Failure to Progress in First Stage      Name: SHADIA BENTON      Apgar1: 8  Apgar5: 8   1 Term     F Vag-Spont EPI N MISSAEL     Past Medical History:   Diagnosis Date    Anemia     Hypertension     AFTER FIRST CHILD    Pregnancy          Past Surgical History:   Procedure Laterality Date     SECTION N/A 2019    Procedure:  SECTION;  Surgeon: Kyree Liu MD;  Location: [facility] OR;  Service: OB/GYN;  Laterality: N/A;     SECTION N/A 10/1/2020    Procedure:  SECTION;  Surgeon: Kyree Liu MD;  Location: [facility] OR;  Service: OB/GYN;  Laterality: N/A;    VAGINAL DELIVERY         PTA Medications   Medication Sig    aspirin (ECOTRIN) 81 MG EC tablet Take 81 mg by mouth once daily.    prenatal vits62/FA/om3/dha/epa (PRENATAL GUMMY ORAL) Take 2 tablets by mouth once daily.       Review of patient's allergies indicates:   Allergen Reactions    Biaxin [clarithromycin]      Pt states when she was young and unsure of type of reaction        Family History       Problem Relation (Age of Onset)    Hypertension Mother          Tobacco Use    Smoking status: Never    Smokeless  tobacco: Never   Substance and Sexual Activity    Alcohol use: No    Drug use: No    Sexual activity: Yes     Review of Systems   Constitutional:  Negative for appetite change, diaphoresis, fever and unexpected weight change.   Eyes:  Negative for visual disturbance.   Respiratory:  Negative for cough.    Cardiovascular:  Positive for leg swelling. Negative for chest pain.   Gastrointestinal:  Negative for abdominal pain, constipation, diarrhea, nausea and vomiting.   Genitourinary:  Negative for vaginal bleeding, vaginal discharge, vaginal pain, vaginal dryness and vaginal odor.   Musculoskeletal:  Negative for arthralgias and back pain.   Neurological:  Negative for headaches.   Psychiatric/Behavioral:  The patient is not nervous/anxious.       Objective:     Vital Signs (Most Recent):  Temp: 98.2 °F (36.8 °C) (09/26/23 0455)  Pulse: 77 (09/26/23 0455)  Resp: 14 (09/26/23 0455)  BP: (!) 146/87 (09/26/23 0455)  SpO2: 98 % (09/26/23 0455) Vital Signs (24h Range):  Temp:  [98.2 °F (36.8 °C)-99.3 °F (37.4 °C)] 98.2 °F (36.8 °C)  Pulse:  [77-86] 77  Resp:  [14-18] 14  SpO2:  [96 %-99 %] 98 %  BP: (138-167)/(79-97) 146/87     Weight: 79.9 kg (176 lb 4.1 oz)  Body mass index is 33.3 kg/m².    FHT: 135 bpm, mod variability, + 1 accels, + 1 variable decels; overall reassuring and appropriate for gestational age   TOCO:  None      Physical Exam:   Constitutional: She is oriented to person, place, and time. She appears well-developed and well-nourished.    HENT:   Head: Normocephalic.    Eyes: Pupils are equal, round, and reactive to light.     Cardiovascular:  Normal rate.             Pulmonary/Chest: Effort normal. No respiratory distress.      Abdominal: Soft. Bowel sounds are normal. There is no abdominal tenderness. There is no rebound and no guarding.     Genitourinary:    Vagina normal.   No  no vaginal discharge in the vagina.           Musculoskeletal: Normal range of motion.       Neurological: She is alert and  oriented to person, place, and time.    Skin: Skin is warm and dry.    Psychiatric: She has a normal mood and affect. Her behavior is normal. Judgment and thought content normal.     Cervix: Deferred     Significant Labs:  Lab Results   Component Value Date    [facility] POS 10/01/2020     Recent Labs   Lab 23  0415 23  0245   WBC 15.20* 15.70*   HGB 9.5* 9.5*   HCT 29.6* 29.5*   MCV 79* 79*    401     Recent Labs   Lab 23  0415 23  0859 23  0245   *  --  135*   K 3.8  --  3.8     --  107   CO2 19*  --  21*   BUN 13  --  15   CREATININE 0.58*  --  0.7   *  --  109   PROT 5.9*  --  5.4*   BILITOT 0.3  --  0.1   ALKPHOS 81  --  69   ALT 18  --  10   AST 26  --  13   MG  --  7.5*  --      24H U: 3564 (OSH)   PCR 3.74    Assessment/Plan:     27 y.o. female  at 25w6d for:    * Severe pre-eclampsia in second trimester  - PreE w/ SF diagnosed in the setting of new proteinuria and sustained severely elevated BP requiring multiple acute anti-hypertensives, has since stabilized.   - Asymptomatic for PreE s/s   - VSS, mild range; Will continue to monitor q 4hrs or PRN   - Labs; Continue q 72H unless otherwise indicated (last , next )    - PCR 3.74 (Baseline 0.08 on ), 24hrU 3564    - Plts 401    - Cr 0.7    - AST/ALT 13/10   - Current Regimen:    - Procardia XL 60 mg ()   - VTE PPx: Lovenox qD in the setting of nephrotic range proteinuria; Will need to be discontinued 12hrs prior to neuraxial anesthesia   - Will continue to monitor blood pressures closely and optimize with uptitration of medications as needed   - Will continue inpatient management at this time with ideal delivery at 34w0d unless otherwise indicated by maternal or fetal indications     Anemia of Pregnancy  - Asymptomatic   - .; MCV consistent with microcytic anemia   - Continue Fe/Colace   - Can consider Fe studies     25 weeks gestation of pregnancy  - Primary OB: Andre  -  Delivery consents, including vaginal, operative, and , and blood consents reviewed and signed at time of admission.   - Presentation: vtx; If moving forward with delivery, will rescan to determine fetal pesentation   - Growth Ultrasound: 802g 38th%; Will repeat growth 10/16   - Diet: Regular  - Monitoring: NST/TOCO BID  - Labs: UTD  - Aneuploidy Screening: Not documented   - 1hr GTT: Not completed; Will perform once out of steroid affect   - GBS: unknown; Will collect if moving towards delivery   - BMZ course completed at OSH; Can receive rescue BMZ as soon as  if moving towards delivery     - Magnesium to be administered (6+2) for fetal neuroprotection if delivery indicated before 32w0d. If > 32w0d, administer (4+2) for maternal seizure prophylaxis.    - Tdap: Needs between 27-36 weeks   - Continue PNV  - Contraception: Undecided; Will continue to discuss     History of  section  - History of C/S x2, low transverse   - Desires repeat  section     Bhakti Obregon MD  Obstetrics  [facility] - Antepartum

## 2023-09-26 NOTE — PLAN OF CARE
Problem: Adult Inpatient Plan of Care  Goal: Plan of Care Review  Outcome: Ongoing, Progressing     Problem: Adult Inpatient Plan of Care  Goal: Absence of Hospital-Acquired Illness or Injury  Outcome: Ongoing, Progressing     Problem: Adult Inpatient Plan of Care  Goal: Optimal Comfort and Wellbeing  Outcome: Ongoing, Progressing     Problem:  Fall Injury Risk  Goal: Absence of Fall, Infant Drop and Related Injury  Outcome: Ongoing, Progressing     Problem: Hypertensive Disorders in Pregnancy  Goal: Maternal-Fetal Stabilization  Outcome: Ongoing, Progressing

## 2023-09-26 NOTE — PLAN OF CARE
Problem: Adult Inpatient Plan of Care  Goal: Optimal Comfort and Wellbeing  Outcome: Ongoing, Progressing     Problem: Hypertensive Disorders in Pregnancy  Goal: Maternal-Fetal Stabilization  Outcome: Ongoing, Progressing     Problem: Maternal-Fetal Wellbeing  Goal: Optimal Maternal-Fetal Wellbeing  Outcome: Ongoing, Progressing     Stable. Vss. No severe BP noted.=140s. Denies HA or vision changes. NST completed. Denies lof or vaginal bleeding.

## 2023-09-26 NOTE — CARE UPDATE
PM NST     FHT: 140 baseline, +accel, -decel, mod variability     Lerna: No contractions     NST reactive and reassuring for gestational age.      Plan: F/u with AM NST      Sterling Randolph MD PGY-2  Obstetrics and Gynecology

## 2023-09-27 LAB
ALLENS TEST: ABNORMAL
ALLENS TEST: ABNORMAL
BASOPHILS # BLD AUTO: 0.05 K/UL (ref 0–0.2)
BASOPHILS NFR BLD: 0.2 % (ref 0–1.9)
DIFFERENTIAL METHOD: ABNORMAL
EOSINOPHIL # BLD AUTO: 0 K/UL (ref 0–0.5)
EOSINOPHIL NFR BLD: 0.1 % (ref 0–8)
ERYTHROCYTE [DISTWIDTH] IN BLOOD BY AUTOMATED COUNT: 14 % (ref 11.5–14.5)
HCO3 UR-SCNC: 26.4 MMOL/L (ref 12–29)
HCO3 UR-SCNC: 27.3 MMOL/L (ref 17–27)
HCT VFR BLD AUTO: 32 % (ref 37–48.5)
HCT VFR BLD CALC: 37 %PCV (ref 36–54)
HCT VFR BLD CALC: 38 %PCV (ref 36–54)
HGB BLD-MCNC: 10 G/DL (ref 12–16)
HGB BLD-MCNC: 13 G/DL
HGB BLD-MCNC: 13 G/DL
IMM GRANULOCYTES # BLD AUTO: 0.18 K/UL (ref 0–0.04)
IMM GRANULOCYTES NFR BLD AUTO: 0.8 % (ref 0–0.5)
LYMPHOCYTES # BLD AUTO: 1.5 K/UL (ref 1–4.8)
LYMPHOCYTES NFR BLD: 6.5 % (ref 18–48)
MCH RBC QN AUTO: 25.2 PG (ref 27–31)
MCHC RBC AUTO-ENTMCNC: 31.3 G/DL (ref 32–36)
MCV RBC AUTO: 81 FL (ref 82–98)
MONOCYTES # BLD AUTO: 1.2 K/UL (ref 0.3–1)
MONOCYTES NFR BLD: 5.2 % (ref 4–15)
NEUTROPHILS # BLD AUTO: 19.7 K/UL (ref 1.8–7.7)
NEUTROPHILS NFR BLD: 87.2 % (ref 38–73)
NRBC BLD-RTO: 0 /100 WBC
PCO2 BLDA: 57.1 MMHG (ref 27–49)
PCO2 BLDA: 61.5 MMHG (ref 32–66)
PH SMN: 7.25 [PH] (ref 7.18–7.38)
PH SMN: 7.27 [PH] (ref 7.25–7.45)
PLATELET # BLD AUTO: 372 K/UL (ref 150–450)
PMV BLD AUTO: 9.4 FL (ref 9.2–12.9)
PO2 BLDA: 13 MMHG (ref 17–41)
PO2 BLDA: 9 MMHG (ref 6–31)
POC BE: -1 MMOL/L
POC BE: 0 MMOL/L
POC IONIZED CALCIUM: 1.37 MMOL/L (ref 1.06–1.42)
POC IONIZED CALCIUM: 1.37 MMOL/L (ref 1.06–1.42)
POC PCO2 TEMP: 57.1 MMHG
POC PCO2 TEMP: 61.5 MMHG
POC PH TEMP: 7.25
POC PH TEMP: 7.27
POC PO2 TEMP: 13 MMHG
POC PO2 TEMP: 9 MMHG
POC SATURATED O2: 12 %
POC SATURATED O2: 7 %
POC TCO2: 28 MMOL/L (ref 23–27)
POC TCO2: 29 MMOL/L (ref 23–27)
POC TEMPERATURE: ABNORMAL
POC TEMPERATURE: ABNORMAL
POTASSIUM BLD-SCNC: 4.9 MMOL/L (ref 3.5–5.1)
POTASSIUM BLD-SCNC: 5.5 MMOL/L (ref 3.5–5.1)
PROVIDER NOTIFIED: 1111
PROVIDER NOTIFIED: 1111
RBC # BLD AUTO: 3.97 M/UL (ref 4–5.4)
SAMPLE: ABNORMAL
SAMPLE: ABNORMAL
SITE: ABNORMAL
SITE: ABNORMAL
SODIUM BLD-SCNC: 136 MMOL/L (ref 136–145)
SODIUM BLD-SCNC: 136 MMOL/L (ref 136–145)
TIME NOTIFIED: 14
TIME NOTIFIED: 15
WBC # BLD AUTO: 22.52 K/UL (ref 3.9–12.7)

## 2023-09-27 PROCEDURE — 25000242 PHARM REV CODE 250 ALT 637 W/ HCPCS: Performed by: STUDENT IN AN ORGANIZED HEALTH CARE EDUCATION/TRAINING PROGRAM

## 2023-09-27 PROCEDURE — 63600175 PHARM REV CODE 636 W HCPCS: Performed by: STUDENT IN AN ORGANIZED HEALTH CARE EDUCATION/TRAINING PROGRAM

## 2023-09-27 PROCEDURE — 25000003 PHARM REV CODE 250: Performed by: STUDENT IN AN ORGANIZED HEALTH CARE EDUCATION/TRAINING PROGRAM

## 2023-09-27 PROCEDURE — 88307 PR  SURG PATH,LEVEL V: ICD-10-PCS | Mod: 26,,, | Performed by: PATHOLOGY

## 2023-09-27 PROCEDURE — 25000003 PHARM REV CODE 250

## 2023-09-27 PROCEDURE — 99900035 HC TECH TIME PER 15 MIN (STAT)

## 2023-09-27 PROCEDURE — 94761 N-INVAS EAR/PLS OXIMETRY MLT: CPT

## 2023-09-27 PROCEDURE — 85025 COMPLETE CBC W/AUTO DIFF WBC: CPT | Performed by: STUDENT IN AN ORGANIZED HEALTH CARE EDUCATION/TRAINING PROGRAM

## 2023-09-27 PROCEDURE — 51702 INSERT TEMP BLADDER CATH: CPT

## 2023-09-27 PROCEDURE — 88307 TISSUE EXAM BY PATHOLOGIST: CPT | Mod: 26,,, | Performed by: PATHOLOGY

## 2023-09-27 PROCEDURE — 82803 BLOOD GASES ANY COMBINATION: CPT

## 2023-09-27 PROCEDURE — 88307 TISSUE EXAM BY PATHOLOGIST: CPT | Performed by: PATHOLOGY

## 2023-09-27 PROCEDURE — 11000001 HC ACUTE MED/SURG PRIVATE ROOM

## 2023-09-27 PROCEDURE — 36415 COLL VENOUS BLD VENIPUNCTURE: CPT | Performed by: STUDENT IN AN ORGANIZED HEALTH CARE EDUCATION/TRAINING PROGRAM

## 2023-09-27 PROCEDURE — 27000221 HC OXYGEN, UP TO 24 HOURS

## 2023-09-27 RX ORDER — PRENATAL WITH FERROUS FUM AND FOLIC ACID 3080; 920; 120; 400; 22; 1.84; 3; 20; 10; 1; 12; 200; 27; 25; 2 [IU]/1; [IU]/1; MG/1; [IU]/1; MG/1; MG/1; MG/1; MG/1; MG/1; MG/1; UG/1; MG/1; MG/1; MG/1; MG/1
1 TABLET ORAL DAILY
Status: DISCONTINUED | OUTPATIENT
Start: 2023-09-27 | End: 2023-09-29 | Stop reason: HOSPADM

## 2023-09-27 RX ORDER — TRANEXAMIC ACID 10 MG/ML
1000 INJECTION, SOLUTION INTRAVENOUS ONCE AS NEEDED
Status: DISCONTINUED | OUTPATIENT
Start: 2023-09-27 | End: 2023-09-29 | Stop reason: HOSPADM

## 2023-09-27 RX ORDER — IBUPROFEN 400 MG/1
800 TABLET ORAL
Status: DISCONTINUED | OUTPATIENT
Start: 2023-09-28 | End: 2023-09-29 | Stop reason: HOSPADM

## 2023-09-27 RX ORDER — LIDOCAINE HYDROCHLORIDE 20 MG/ML
INJECTION, SOLUTION EPIDURAL; INFILTRATION; INTRACAUDAL; PERINEURAL
Status: DISCONTINUED | OUTPATIENT
Start: 2023-09-26 | End: 2023-09-27

## 2023-09-27 RX ORDER — EPINEPHRINE 1 MG/ML
INJECTION, SOLUTION, CONCENTRATE INTRAVENOUS
Status: DISCONTINUED | OUTPATIENT
Start: 2023-09-27 | End: 2023-09-27

## 2023-09-27 RX ORDER — AMOXICILLIN 250 MG
1 CAPSULE ORAL NIGHTLY PRN
Status: DISCONTINUED | OUTPATIENT
Start: 2023-09-27 | End: 2023-09-29 | Stop reason: HOSPADM

## 2023-09-27 RX ORDER — MISOPROSTOL 200 UG/1
800 TABLET ORAL ONCE AS NEEDED
Status: DISCONTINUED | OUTPATIENT
Start: 2023-09-27 | End: 2023-09-29 | Stop reason: HOSPADM

## 2023-09-27 RX ORDER — ONDANSETRON 2 MG/ML
4 INJECTION INTRAMUSCULAR; INTRAVENOUS EVERY 6 HOURS PRN
Status: DISCONTINUED | OUTPATIENT
Start: 2023-09-27 | End: 2023-09-29 | Stop reason: HOSPADM

## 2023-09-27 RX ORDER — SUCCINYLCHOLINE CHLORIDE 20 MG/ML INJECTION SOLUTION
SOLUTION
Status: DISCONTINUED | OUTPATIENT
Start: 2023-09-26 | End: 2023-09-27

## 2023-09-27 RX ORDER — ONDANSETRON 2 MG/ML
INJECTION INTRAMUSCULAR; INTRAVENOUS
Status: DISCONTINUED | OUTPATIENT
Start: 2023-09-27 | End: 2023-09-27

## 2023-09-27 RX ORDER — DOCUSATE SODIUM 100 MG/1
200 CAPSULE, LIQUID FILLED ORAL 2 TIMES DAILY
Status: DISCONTINUED | OUTPATIENT
Start: 2023-09-27 | End: 2023-09-29 | Stop reason: HOSPADM

## 2023-09-27 RX ORDER — ONDANSETRON 8 MG/1
8 TABLET, ORALLY DISINTEGRATING ORAL EVERY 8 HOURS PRN
Status: DISCONTINUED | OUTPATIENT
Start: 2023-09-27 | End: 2023-09-29 | Stop reason: HOSPADM

## 2023-09-27 RX ORDER — KETOROLAC TROMETHAMINE 30 MG/ML
30 INJECTION, SOLUTION INTRAMUSCULAR; INTRAVENOUS
Status: DISPENSED | OUTPATIENT
Start: 2023-09-27 | End: 2023-09-28

## 2023-09-27 RX ORDER — FUROSEMIDE 10 MG/ML
40 INJECTION INTRAMUSCULAR; INTRAVENOUS ONCE
Status: COMPLETED | OUTPATIENT
Start: 2023-09-27 | End: 2023-09-27

## 2023-09-27 RX ORDER — PHENYLEPHRINE HCL IN 0.9% NACL 1 MG/10 ML
SYRINGE (ML) INTRAVENOUS
Status: DISCONTINUED | OUTPATIENT
Start: 2023-09-27 | End: 2023-09-27

## 2023-09-27 RX ORDER — DEXAMETHASONE SODIUM PHOSPHATE 4 MG/ML
INJECTION, SOLUTION INTRA-ARTICULAR; INTRALESIONAL; INTRAMUSCULAR; INTRAVENOUS; SOFT TISSUE
Status: DISCONTINUED | OUTPATIENT
Start: 2023-09-27 | End: 2023-09-27

## 2023-09-27 RX ORDER — KETOROLAC TROMETHAMINE 30 MG/ML
INJECTION, SOLUTION INTRAMUSCULAR; INTRAVENOUS
Status: DISCONTINUED | OUTPATIENT
Start: 2023-09-27 | End: 2023-09-27

## 2023-09-27 RX ORDER — ENOXAPARIN SODIUM 100 MG/ML
40 INJECTION SUBCUTANEOUS EVERY 24 HOURS
Status: DISCONTINUED | OUTPATIENT
Start: 2023-09-27 | End: 2023-09-29 | Stop reason: HOSPADM

## 2023-09-27 RX ORDER — OXYCODONE HYDROCHLORIDE 5 MG/1
5 TABLET ORAL EVERY 4 HOURS PRN
Status: DISCONTINUED | OUTPATIENT
Start: 2023-09-27 | End: 2023-09-29 | Stop reason: HOSPADM

## 2023-09-27 RX ORDER — DIPHENHYDRAMINE HCL 25 MG
25 CAPSULE ORAL EVERY 6 HOURS PRN
Status: DISCONTINUED | OUTPATIENT
Start: 2023-09-27 | End: 2023-09-29 | Stop reason: HOSPADM

## 2023-09-27 RX ORDER — OXYTOCIN 10 [USP'U]/ML
INJECTION, SOLUTION INTRAMUSCULAR; INTRAVENOUS
Status: DISCONTINUED | OUTPATIENT
Start: 2023-09-27 | End: 2023-09-27

## 2023-09-27 RX ORDER — NALOXONE HCL 0.4 MG/ML
0.02 VIAL (ML) INJECTION
Status: DISCONTINUED | OUTPATIENT
Start: 2023-09-27 | End: 2023-09-28

## 2023-09-27 RX ORDER — HYDROMORPHONE HCL IN 0.9% NACL 6 MG/30 ML
PATIENT CONTROLLED ANALGESIA SYRINGE INTRAVENOUS CONTINUOUS
Status: DISCONTINUED | OUTPATIENT
Start: 2023-09-27 | End: 2023-09-28

## 2023-09-27 RX ORDER — FENTANYL CITRATE 50 UG/ML
INJECTION, SOLUTION INTRAMUSCULAR; INTRAVENOUS
Status: DISCONTINUED | OUTPATIENT
Start: 2023-09-27 | End: 2023-09-27

## 2023-09-27 RX ORDER — FUROSEMIDE 20 MG/1
20 TABLET ORAL DAILY
Status: DISCONTINUED | OUTPATIENT
Start: 2023-09-28 | End: 2023-09-29 | Stop reason: HOSPADM

## 2023-09-27 RX ORDER — DIPHENHYDRAMINE HYDROCHLORIDE 50 MG/ML
12.5 INJECTION INTRAMUSCULAR; INTRAVENOUS
Status: DISCONTINUED | OUTPATIENT
Start: 2023-09-27 | End: 2023-09-29 | Stop reason: HOSPADM

## 2023-09-27 RX ORDER — CARBOPROST TROMETHAMINE 250 UG/ML
250 INJECTION, SOLUTION INTRAMUSCULAR
Status: DISCONTINUED | OUTPATIENT
Start: 2023-09-27 | End: 2023-09-29 | Stop reason: HOSPADM

## 2023-09-27 RX ORDER — OXYCODONE HYDROCHLORIDE 10 MG/1
10 TABLET ORAL EVERY 4 HOURS PRN
Status: DISCONTINUED | OUTPATIENT
Start: 2023-09-27 | End: 2023-09-29 | Stop reason: HOSPADM

## 2023-09-27 RX ORDER — NALBUPHINE HYDROCHLORIDE 10 MG/ML
5 INJECTION, SOLUTION INTRAMUSCULAR; INTRAVENOUS; SUBCUTANEOUS ONCE AS NEEDED
Status: DISCONTINUED | OUTPATIENT
Start: 2023-09-27 | End: 2023-09-29 | Stop reason: HOSPADM

## 2023-09-27 RX ORDER — HYDROCORTISONE 25 MG/G
CREAM TOPICAL 3 TIMES DAILY PRN
Status: DISCONTINUED | OUTPATIENT
Start: 2023-09-27 | End: 2023-09-29 | Stop reason: HOSPADM

## 2023-09-27 RX ORDER — PROPOFOL 10 MG/ML
VIAL (ML) INTRAVENOUS
Status: DISCONTINUED | OUTPATIENT
Start: 2023-09-26 | End: 2023-09-27

## 2023-09-27 RX ORDER — ACETAMINOPHEN 325 MG/1
650 TABLET ORAL
Status: DISCONTINUED | OUTPATIENT
Start: 2023-09-27 | End: 2023-09-29 | Stop reason: HOSPADM

## 2023-09-27 RX ORDER — SIMETHICONE 80 MG
1 TABLET,CHEWABLE ORAL EVERY 6 HOURS PRN
Status: DISCONTINUED | OUTPATIENT
Start: 2023-09-27 | End: 2023-09-29 | Stop reason: HOSPADM

## 2023-09-27 RX ORDER — FUROSEMIDE 10 MG/ML
20 INJECTION INTRAMUSCULAR; INTRAVENOUS ONCE
Status: COMPLETED | OUTPATIENT
Start: 2023-09-27 | End: 2023-09-27

## 2023-09-27 RX ORDER — PROCHLORPERAZINE EDISYLATE 5 MG/ML
5 INJECTION INTRAMUSCULAR; INTRAVENOUS EVERY 6 HOURS PRN
Status: DISCONTINUED | OUTPATIENT
Start: 2023-09-27 | End: 2023-09-29 | Stop reason: HOSPADM

## 2023-09-27 RX ORDER — ALBUTEROL SULFATE 90 UG/1
AEROSOL, METERED RESPIRATORY (INHALATION)
Status: DISCONTINUED | OUTPATIENT
Start: 2023-09-27 | End: 2023-09-27

## 2023-09-27 RX ORDER — SODIUM CHLORIDE, SODIUM LACTATE, POTASSIUM CHLORIDE, CALCIUM CHLORIDE 600; 310; 30; 20 MG/100ML; MG/100ML; MG/100ML; MG/100ML
INJECTION, SOLUTION INTRAVENOUS CONTINUOUS
Status: DISCONTINUED | OUTPATIENT
Start: 2023-09-27 | End: 2023-09-27

## 2023-09-27 RX ADMIN — SODIUM CHLORIDE, POTASSIUM CHLORIDE, SODIUM LACTATE AND CALCIUM CHLORIDE: 600; 310; 30; 20 INJECTION, SOLUTION INTRAVENOUS at 04:09

## 2023-09-27 RX ADMIN — Medication: at 02:09

## 2023-09-27 RX ADMIN — ALBUTEROL SULFATE 10 PUFF: 90 AEROSOL, METERED RESPIRATORY (INHALATION) at 12:09

## 2023-09-27 RX ADMIN — KETOROLAC TROMETHAMINE 30 MG: 30 INJECTION, SOLUTION INTRAMUSCULAR; INTRAVENOUS at 06:09

## 2023-09-27 RX ADMIN — EPINEPHRINE 5 MCG: 1 INJECTION, SOLUTION, CONCENTRATE INTRAVENOUS at 12:09

## 2023-09-27 RX ADMIN — NIFEDIPINE 30 MG: 30 TABLET, FILM COATED, EXTENDED RELEASE ORAL at 09:09

## 2023-09-27 RX ADMIN — ACETAMINOPHEN 650 MG: 325 TABLET, FILM COATED ORAL at 12:09

## 2023-09-27 RX ADMIN — EPINEPHRINE 10 MCG: 1 INJECTION, SOLUTION, CONCENTRATE INTRAVENOUS at 12:09

## 2023-09-27 RX ADMIN — FUROSEMIDE 40 MG: 10 INJECTION, SOLUTION INTRAMUSCULAR; INTRAVENOUS at 12:09

## 2023-09-27 RX ADMIN — DOCUSATE SODIUM 200 MG: 100 CAPSULE, LIQUID FILLED ORAL at 09:09

## 2023-09-27 RX ADMIN — NIFEDIPINE 60 MG: 30 TABLET, FILM COATED, EXTENDED RELEASE ORAL at 09:09

## 2023-09-27 RX ADMIN — ACETAMINOPHEN 650 MG: 325 TABLET, FILM COATED ORAL at 06:09

## 2023-09-27 RX ADMIN — FENTANYL CITRATE 50 MCG: 50 INJECTION, SOLUTION INTRAMUSCULAR; INTRAVENOUS at 12:09

## 2023-09-27 RX ADMIN — MAGNESIUM SULFATE HEPTAHYDRATE 2 G/HR: 40 INJECTION, SOLUTION INTRAVENOUS at 06:09

## 2023-09-27 RX ADMIN — KETOROLAC TROMETHAMINE 30 MG: 30 INJECTION, SOLUTION INTRAMUSCULAR; INTRAVENOUS at 12:09

## 2023-09-27 RX ADMIN — LABETALOL HYDROCHLORIDE 200 MG: 200 TABLET, FILM COATED ORAL at 09:09

## 2023-09-27 RX ADMIN — FUROSEMIDE 20 MG: 10 INJECTION, SOLUTION INTRAMUSCULAR; INTRAVENOUS at 09:09

## 2023-09-27 RX ADMIN — Medication 100 MCG: at 12:09

## 2023-09-27 RX ADMIN — ENOXAPARIN SODIUM 40 MG: 40 INJECTION SUBCUTANEOUS at 05:09

## 2023-09-27 RX ADMIN — OXYTOCIN 5 UNITS: 10 INJECTION, SOLUTION INTRAMUSCULAR; INTRAVENOUS at 12:09

## 2023-09-27 RX ADMIN — KETOROLAC TROMETHAMINE 30 MG: 30 INJECTION, SOLUTION INTRAMUSCULAR; INTRAVENOUS at 01:09

## 2023-09-27 RX ADMIN — PRENATAL VIT W/ FE FUMARATE-FA TAB 27-0.8 MG 1 TABLET: 27-0.8 TAB at 09:09

## 2023-09-27 RX ADMIN — ONDANSETRON 4 MG: 2 INJECTION INTRAMUSCULAR; INTRAVENOUS at 12:09

## 2023-09-27 RX ADMIN — FENTANYL CITRATE 25 MCG: 50 INJECTION, SOLUTION INTRAMUSCULAR; INTRAVENOUS at 12:09

## 2023-09-27 RX ADMIN — DEXAMETHASONE SODIUM PHOSPHATE 4 MG: 4 INJECTION INTRA-ARTICULAR; INTRALESIONAL; INTRAMUSCULAR; INTRAVENOUS; SOFT TISSUE at 12:09

## 2023-09-27 RX ADMIN — Medication 95 MILLI-UNITS/MIN: at 01:09

## 2023-09-27 RX ADMIN — SODIUM CHLORIDE, POTASSIUM CHLORIDE, SODIUM LACTATE AND CALCIUM CHLORIDE: 600; 310; 30; 20 INJECTION, SOLUTION INTRAVENOUS at 06:09

## 2023-09-27 RX ADMIN — FERROUS SULFATE TAB 325 MG (65 MG ELEMENTAL FE) 1 EACH: 325 (65 FE) TAB at 09:09

## 2023-09-27 NOTE — ANESTHESIA PREPROCEDURE EVALUATION
[facility]  Anesthesia Pre-Operative Evaluation         Patient Name: Steve Sharma  YOB: 1995  MRN: [id number]    2023      Steve Sharma is a 27 y.o. female  @ 25w4d who presents as transfer from OSH due to pre-eclampsia w SF (BP). Now asymptomatic. Denies cardiopulmonary, bleeding, or spine disorders. No problems with past neuraxial anesthesia. Planning for inpatient management until delivery at 34wks unless sooner indication.    IUP c/b previous  section x 2, prior  delivery, obesity, anemia, and now pre-eclampsia    OB History    Para Term  AB Living   4 3 2 1   3   SAB IAB Ectopic Multiple Live Births         0 3      # Outcome Date GA Lbr Edward/2nd Weight Sex Delivery Anes PTL Lv   4 Current            3 Term 10/01/20 39w0d  2.975 kg (6 lb 8.9 oz) F CS-LTranv Spinal  MISSAEL   2  19 35w4d  2.32 kg (5 lb 1.8 oz) F CS-LTranv EPI  MISSAEL      Complications: Fetal Intolerance, Failure to Progress in First Stage   1 Term     F Vag-Spont EPI N MISSAEL       Review of patient's allergies indicates:   Allergen Reactions    Biaxin [clarithromycin]      Pt states when she was young and unsure of type of reaction       Wt Readings from Last 1 Encounters:   23 0823 80.3 kg (177 lb 0.5 oz)   23 1200 79.9 kg (176 lb 4.1 oz)   23 0801 79.3 kg (174 lb 15 oz)       BP Readings from Last 3 Encounters:   23 (!) 139/90   23 (!) 152/85   22 128/85       Patient Active Problem List   Diagnosis    Abdominal pain in pregnancy, second trimester    Severe pre-eclampsia in second trimester    History of  section    25 weeks gestation of pregnancy    Anemia of Pregnancy    Anemia during pregnancy in second trimester       Past Surgical History:   Procedure Laterality Date     SECTION N/A 2019    Procedure:  SECTION;  Surgeon: Kyree Liu MD;  Location: [facility] OR;  Service:  "OB/GYN;  Laterality: N/A;     SECTION N/A 10/1/2020    Procedure:  SECTION;  Surgeon: Kyree Liu MD;  Location: [facility] OR;  Service: OB/GYN;  Laterality: N/A;    VAGINAL DELIVERY         Social History     Socioeconomic History    Marital status: Single   Tobacco Use    Smoking status: Never    Smokeless tobacco: Never   Substance and Sexual Activity    Alcohol use: No    Drug use: No    Sexual activity: Yes         Chemistry        Component Value Date/Time     2023    K 4.5 2023     2023    CO2 22 (L) 2023    BUN 21 (H) 2023    CREATININE 0.7 2023     2023        Component Value Date/Time    CALCIUM 8.7 2023    ALKPHOS 77 2023    AST 15 2023    ALT 10 2023 2013    BILITOT 0.2 2023 2013    ESTGFRAFRICA >60 10/01/2020 1007    EGFRNONAA >60 10/01/2020 1007            Lab Results   Component Value Date    WBC 14.74 (H) 2023    HGB 10.2 (L) 2023    HCT 37 2023    MCV 79 (L) 2023     2023       No results for input(s): "PT", "INR", "PROTIME", "APTT" in the last 72 hours.                                                                                                                     2023  Steve Sharma is a 27 y.o., female.      Pre-op Assessment    I have reviewed the Patient Summary Reports.     I have reviewed the Nursing Notes.    I have reviewed the Medications.     Review of Systems  Anesthesia Hx:  No problems with previous Anesthesia  History of prior surgery of interest to airway management or planning: Denies Family Hx of Anesthesia complications.   Denies Personal Hx of Anesthesia complications.   Hematology/Oncology:     Oncology Normal     Cardiovascular:   Hypertension  Denies Angina. ECG has been reviewed.    Pulmonary:   Denies Shortness of breath.  Denies Recent URI.  "   Renal/:  Renal/ Normal     Hepatic/GI:   Denies GERD. Denies Liver Disease.    Neurological:   Denies CVA. Denies Seizures.    Endocrine:  Endocrine Normal Denies Diabetes.        Physical Exam  General: Well nourished, Cooperative, Alert and Oriented    Airway:  Mallampati: II   Mouth Opening: Normal  TM Distance: Normal  Tongue: Normal  Neck ROM: Normal ROM    Dental:  Intact        Anesthesia Plan  Type of Anesthesia, risks & benefits discussed:    Anesthesia Type: Gen ETT, Epidural, Spinal, CSE  Intra-op Monitoring Plan: Standard ASA Monitors  Post Op Pain Control Plan: multimodal analgesia, IV/PO Opioids PRN and epidural analgesia  Informed Consent: Informed consent signed with the Patient and all parties understand the risks and agree with anesthesia plan.  All questions answered.   ASA Score: 3  Day of Surgery Review of History & Physical: H&P Update referred to the surgeon/provider.    Ready For Surgery From Anesthesia Perspective.     .

## 2023-09-27 NOTE — BRIEF OP NOTE
BRIEF OPERATIVE NOTE       SUMMARY      Surgery Date: 2023     SURGEON: Rima Beck    ASSISTANT: Devin Diego MD, PGY-4    PREOP DIAGNOSIS:   25 week 6 day intrauterine pregnancy  Transverse fetal presentation  PreE w/ severe features  Pulmonary edema  Nephrotic range proteinuria  Anemia  History of  section x2    POSTOP DIAGNOSIS:  same and s/p repeat classical  section    PROCEDURES: Repeat Classical  section via pfannenstiel skin incision    ANESTHESIA: general    FINDINGS/KEY COMPONENTS: Moderate volume abdominal ascites, viable female infant in breech presentation, dense fascial adhesions, dense intraabdominal adhesion to lower uterine segment    ESTIMATED BLOOD LOSS: 1000cc    COMPLICATIONS: none    PATHOLOGY:   Specimens (From admission, onward)       Start     Ordered    23 0009  Specimen to Pathology, Surgery Gynecology and Obstetrics  Once        Comments: Pre-op Diagnosis: Pre-eclampsia, severe at 25 weeks gestation [O14.10]Procedure(s): SECTION Number of specimens: 1Name of specimens: placenta     References:    Click here for ordering Quick Tip   Question Answer Comment   Procedure Type: Gynecology and Obstetrics    Specimen Class: Complex case/Special    Which provider would you like to cc? RIMA BECK    Which provider would you like to cc? BOB GALINDO    Release to patient Immediate        23 0011

## 2023-09-27 NOTE — NURSING
Pt requesting to wait till later this morning to start on a pump, states she is tired and wanted to rest first. Plans to begin after breakfast.

## 2023-09-27 NOTE — NURSING
"RN to bedside, patient c/o feeling swollen and unable to eat dinner because her "stomach felt too tight." Patient denies feelings of short of breath. VSS. Patient reports passing flatus through out the day. Patient in obvious discomfort and crying. RN attempted to reach MD three times, but unsuccessful.   "

## 2023-09-27 NOTE — L&D DELIVERY NOTE
[facility] - Labor & Delivery   Section   Operative Note       Section Procedure Note    Procedure:   1. Repeat classical  Section via pffanenstiel skin incision    Indications:   1. History of  x2  2. Pre-eclampsia with worsening clinical status due to development of pulmonary edema    Pre-operative Diagnosis:   1. IUP at 25 week 6 day pregnancy  2. Pre-eclampsia w/ severe features (BP, pulmonary edema)  3. Anemia     Post-operative Diagnosis:   same    Surgeon: Rima Bee MD     Assistants: Devin Diego MD - PGY4    Anesthesia: General endotracheal anesthesia    Findings:    Densely scarred fascia  Lower uterine segment adhered to peritoneum  Moderate ascites  Single viable  female infant, with APGARS 4/8, weight 720g.   Normal appearing ovaries and fallopian tubes  Normal placenta, sent to pathology    Estimated Blood Loss:  1084 mL           Total IV Fluids: 250 mL     UOP: 600 mL    PreOp CBC:   Lab Results   Component Value Date    WBC 14.74 (H) 2023    HGB 10.2 (L) 2023    HCT 37 2023    MCV 79 (L) 2023     2023                     Complications:  None; patient tolerated the procedure well.           Disposition: PACU - hemodynamically stable.           Condition: stable    Procedure Details   The patient was taken to Operating Room, identified as Steve Sharma and the procedure verified as  Delivery. A Time Out was held and the above information confirmed.    After induction of anesthesia, the patient was prepped and draped in the usual sterile manner while placed in a dorsal supine position with a left lateral tilt.  A rivera catheter was also placed per nursing. Preoperative antibiotics Ancef 2 g was administered. A Pfannenstiel skin incision was made and carried down through the subcutaneous tissue to the fascia. Fascial incision was made and extended transversely. The fascia was grasped with [facility] clamps and   from the underlying rectus tissue superiorly and inferiorly. The peritoneum was identified, found to be free of adherent bowel, and entered bluntly with moderate ascites noted. The peritoneal incision was extended longitudinally with Bovie cautery. The rectus muscle was transected with Bovie cautery laterally for better visualization and room. The vesico-uterine peritoneum was identified, and bladder blade was inserted. A classical uterine incision was made with knife and extended with bandage scissors. The fetus was noted to be in transverse presentation. The caudal end of the fetus was grasped and brought to the hysterotomy. The amniotic sac was ruptured and the infant was delivered via breech maneuvers. The patient delivered a single viable female infant.  Infant weighed 720 grams with Apgar scores of 4/8 at one and five minutes respectively. After the umbilical cord was clamped and cut, cord blood was obtained for evaluation. The placenta was removed intact, appeared normal, and was sent to pathology. The uterus was exteriorized. The uterine outline, tubes and ovaries appeared normal. The uterine incision was closed in two layers with running locked sutures of 0 chromic. The uterine serosa was closed with a baseball stitch with 4-0 monocryl. Excellent hemostasis was observed.  The uterus was then returned to the abdominal cavity. Incision was reinspected and good hemostasis was noted. The fascia was then reapproximated with running sutures of 0 Vicryl. The skin was reapproximated with 4-0 monocryl in a subcuticular fashion.    Instrument, sponge, and needle counts were correct prior the abdominal closure and at the conclusion of the case.     The patient tolerated procedure well and was taken to the recovery room in stable condition after the procedure.      Devin Diego M.D.  OB/GYN PGY-4            Specimens:   Specimen (24h ago, onward)       Start     Ordered    23 0009  Specimen to  Pathology, Surgery Gynecology and Obstetrics  Once        Comments: Pre-op Diagnosis: Pre-eclampsia, severe at 25 weeks gestation [O14.10]Procedure(s): SECTION Number of specimens: 1Name of specimens: placenta     References:    Click here for ordering Quick Tip   Question Answer Comment   Procedure Type: Gynecology and Obstetrics    Specimen Class: Complex case/Special    Which provider would you like to cc? BIRD BEE    Which provider would you like to cc? BOB GALINDO    Release to patient Immediate        23 0011                           Delivery Information for Heidi Sharma    Birth information:  YOB: 2022   Time of birth: 11:59 PM   Sex: female   Head Delivery Date/Time: 2023 11:59 PM   Delivery type: , Classical   Gestational Age: 26w0d        Delivery Providers    Delivering clinician: Bird Bee MD   Provider Role    Devin Diego MD Resident    Janet Lai RN Circulator    Bernadette Burgos ST Scrub Person    Leonel Olivo MD Anesthesiologist    Breana Ornelas RN Charge Nurse    Brittany Ramos RN Registered Nurse    Pablo Selby MD Anesthesiologist              Measurements    Weight: 720g  Length:          Apgars    Living status: Living  Apgar Component Scores:  1 min.:  5 min.:  10 min.:  15 min.:  20 min.:    Skin color:   0       Heart rate:   2       Reflex irritability:   2       Muscle tone:   2       Respiratory effort:   2       Total:   8       Apgars assigned by: NICU         Operative Delivery    Forceps attempted?: No  Vacuum extractor attempted?: No         Shoulder Dystocia    Shoulder dystocia present?: No           Presentation    Presentation: Soto Breech           Interventions/Resuscitation    Method: NICU Attended       Cord    Vessels: 3 vessels  Complications: None  Delayed Cord Clamping?: No  Cord Clamped Date/Time: 2023 11:59 PM  Cord Blood Disposition: Sent with Baby  Gases Sent?:  Yes  Stem Cell Collection (by MD): No       Placenta    Placenta delivery date/time: 2023 0001  Placenta removal: Manual removal  Placenta appearance: Intact  Placenta disposition: Pathology           Labor Events:       labor: Yes     Labor Onset Date/Time:         Dilation Complete Date/Time:         Start Pushing Date/Time:         Start Pushing Date/Time:       Rupture Date/Time: 23  0058         Rupture type: ARM (Artificial Rupture)         Fluid Amount:       Fluid Color: Clear               steroids: Full Course     Antibiotics given for GBS: No     Induction:       Indications for induction:        Augmentation:       Indications for augmentation:       Labor complications:       Additional complications:          Cervical ripening:                     Delivery:      Episiotomy:       Indication for Episiotomy:       Perineal Lacerations:   Repaired:      Periurethral Laceration:   Repaired:     Labial Laceration:   Repaired:     Sulcus Laceration:   Repaired:     Vaginal Laceration:   Repaired:     Cervical Laceration:   Repaired:     Repair suture:       Repair # of packets:       Last Value - EBL - Nursing (mL):       Sum - EBL - Nursing (mL): 0     Last Value - EBL - Anesthesia (mL):      Calculated QBL (mL): 724      Vaginal Sweep Performed:       Surgicount Correct:       Vaginal Packing:   Quantity:       Other providers:       Anesthesia    Method: General          Details (if applicable):  Trial of Labor No    Categorization: Repeat    Priority: Urgent   Indications for : Repeat Section   Incision Type: classical     Additional  information:  Forceps:    Vacuum:    Breech:    Observed anomalies    Other (Comments):

## 2023-09-27 NOTE — PLAN OF CARE
Problem: Adult Inpatient Plan of Care  Goal: Plan of Care Review  Outcome: Ongoing, Progressing  Goal: Patient-Specific Goal (Individualized)  Outcome: Ongoing, Progressing  Goal: Absence of Hospital-Acquired Illness or Injury  Outcome: Ongoing, Progressing  Goal: Optimal Comfort and Wellbeing  Outcome: Ongoing, Progressing  Goal: Readiness for Transition of Care  Outcome: Ongoing, Progressing     Problem: Hypertensive Disorders in Pregnancy  Goal: Maternal-Fetal Stabilization  Outcome: Ongoing, Progressing     Problem: Adjustment to Role Transition (Postpartum  Delivery)  Goal: Successful Maternal Role Transition  Outcome: Ongoing, Progressing     Problem: Bleeding (Postpartum  Delivery)  Goal: Hemostasis  Outcome: Ongoing, Progressing     Problem: Infection (Postpartum  Delivery)  Goal: Absence of Infection Signs and Symptoms  Outcome: Ongoing, Progressing     Problem: Pain (Postpartum  Delivery)  Goal: Acceptable Pain Control  Outcome: Ongoing, Progressing     Problem: Postoperative Nausea and Vomiting (Postpartum  Delivery)  Goal: Nausea and Vomiting Relief  Outcome: Ongoing, Progressing     Problem: Postoperative Urinary Retention (Postpartum  Delivery)  Goal: Effective Urinary Elimination  Outcome: Ongoing, Progressing

## 2023-09-27 NOTE — CARE UPDATE
PM NST     FHT: 140 baseline, +accel, -decel, mod variability     [place]: No contractions     NST reactive and reassuring for gestational age     Plan: F/u with AM NST    Sterling Randolph MD PGY-2  Obstetrics and Gynecology

## 2023-09-27 NOTE — TRANSFER OF CARE
"Anesthesia Transfer of Care Note    Patient: Steve Sharma    Procedure(s) Performed: Procedure(s) (LRB):   SECTION (N/A)    Patient location: Labor and Delivery    Anesthesia Type: general    Transport from OR: Transported from OR on room air with adequate spontaneous ventilation    Post pain: adequate analgesia    Post assessment: no apparent anesthetic complications    Post vital signs: stable    Level of consciousness: sedated    Nausea/Vomiting: no nausea/vomiting    Complications: none    Transfer of care protocol was followed      Last vitals:   Visit Vitals  BP (!) 155/96   Pulse 107   Temp 36.9 °C (98.5 °F) (Oral)   Resp 18   Ht 5' 1" (1.549 m)   Wt 80.3 kg (177 lb 0.5 oz)   SpO2 98%   Breastfeeding No   BMI 33.45 kg/m²     "

## 2023-09-27 NOTE — LACTATION NOTE
Lactation visited pt. Pt just finished pumping. Helped with hand expression. Obtained 4ml of EBM, labeled and brought to NICU. Pumping for NICU education discussed. Gave NICU breastfeeding book. Discussed pump for after discharge. Gave information to obtain a pump thru her insurance.    09/27/23 1700   Maternal Assessment   Breast Shape Bilateral:;round   Breast Density Bilateral:;soft   Areola Bilateral:;elastic   Nipples Bilateral:;everted   Maternal Infant Feeding   Maternal Emotional State assist needed   Equipment Type   Breast Pump Type double electric, hospital grade   Breast Pump Flange Type hard   Breast Pump Flange Size 24 mm   Breast Pumping   Breast Pumping Interventions frequent pumping encouraged;early pumping promoted   Breast Pumping double electric breast pump utilized;hand expression utilized   Community Referrals   Community Referrals outpatient lactation program;pediatric care provider;support group;WIC (women, infants and children) program     Medela Symphony pump, tubing, collections containers and labels brought to bedside.  Discussed proper pump setting of initiation phase.  Instructed on proper usage of pump and to adjust suction according to maximum comfort level.  Verified appropriate flange fit.  Educated on the frequency and duration of pumping in order to promote and maintain a full milk supply.  Hands on pumping technique reviewed.  Encouraged hand expression after pumping.  Instructed on cleaning of breast pump parts.  Written instructions also given.  Pt verbalized understanding and appropriate recall for proper milk handling, collection, labeling, storage and transportation.     Mother was taught hand expression of breastmilk/colostrum. She was instructed to:  Sit upright and lean forward, if possible.  When feasible, apply warm, wet compress over breasts for a few minutes.   Perform gentle breast massage.  Form a C with her hand and place it about 1 inch back from the areola with  the nipple centered between her index finger and her thumb.  Press, compress, relax:  Using her finger and thumb, apply pressure in an inward direction toward the breast without stretching the tissue, compress the breast tissue between her finger and thumb, then relax her finger and thumb. Repeat process for a few minutes.  Rotate placement of finger and thumb on the breasts to facilitate emptying.  Collect expressed breastmilk/colostrum with a spoon or cup and feed immediately to the baby, if able.  If unable to feed immediately, place breastmilk/colostrum directly into a sterile storage container for later use. Place the babys breast milk label (with the date and time of collection and the names of mother's medications) on the container. Reviewed proper handling and storage of expressed breastmilk.   Patient effectively return demonstrated and verbalized understanding.

## 2023-09-27 NOTE — CARE UPDATE
09/26/2023 @2005: Resident to bedside for evaluation of abdominal pain. Patient reports constant upper abdominal pain, worse in the midline, that has been affecting her since yesterday. She report decreased appetite due to the pain but denies nausea or vomiting. She reports passing flatus and having two bowel movements today.    No contractions noted on the monitor. Pain not reproducible with palpation. Sustained severe range BP x1 which responded to Labetalol 20 mg. STAT CBC and CMP collected, wnl. Tylenol 1g given, GI cocktail without viscous lidocaine, and IV PPI    09/26/2023 @2215: Resident to bedside for repeat evaluation as patient now reporting 7/10 headache, upper back pain, and continued upper abdominal pain. She states that she also feels very short of breath when laying down and feels she needs to sit straight up. Patient tachycardic to 130s, O2 saturation 93-95% on room air. Sustained severe range BP requiring IV hydral 5. Patient also started on Labetalol 200 BID.  Will obtain STAT troponin, BNP, EKG, chest x-ray.  Will restart IV Magnesium with fetal neuro-protection loading dose.   NPO since 3 pm. Received prophylactic Lovenox @ 9559.  Continuous fetal monitoring.    Will continue to closely monitor. Discussed with staff, MFM on call, charge RN, and anesthesia.    Devin Diego M.D.  OB/GYN PGY- 4

## 2023-09-27 NOTE — PROGRESS NOTES
POSTPARTUM PROGRESS NOTE    Subjective:     PPD/POD#: 1   Procedure: Repeat classical C/S   EGA: 25w6d   N/V: No   F/C: No   Abd Pain: Mild, well-controlled with oral pain medication   Lochia: Mild   Voiding: Yes, via rivera catheter    Ambulating: No   Bowel fnc: No   Contraception: Nexplanon to be placed prior to discharge      Objective:      Temp:  [98 °F (36.7 °C)-99 °F (37.2 °C)] 98.1 °F (36.7 °C)  Pulse:  [] 95  Resp:  [16-22] 22  SpO2:  [92 %-99 %] 96 %  BP: (129-179)/() 142/87    Abdomen: Soft, appropriately tender, mildly distended, hypoactive   Uterus: Firm, no fundal tenderness   Incision: Bandage in place without shadowing     Lab Review    Recent Labs   Lab 09/23/23  0415 09/23/23  0859 09/24/23 0245 09/26/23 2013   *  --  135* 137   K 3.8  --  3.8 4.5     --  107 106   CO2 19*  --  21* 22*   BUN 13  --  15 21*   CREATININE 0.58*  --  0.7 0.7   *  --  109 100   PROT 5.9*  --  5.4* 5.5*   BILITOT 0.3  --  0.1 0.2   ALKPHOS 81  --  69 77   ALT 18  --  10 10   AST 26  --  13 15   MG  --  7.5*  --   --        Recent Labs   Lab 09/24/23 0245 09/26/23 2013 09/27/23 0013 09/27/23  0015 09/27/23  0435   WBC 15.70* 14.74*  --   --  22.52*   HGB 9.5* 10.2*  --   --  10.0*   HCT 29.5* 32.1* 38 37 32.0*   MCV 79* 79*  --   --  81*    354  --   --  372         I/O    Intake/Output Summary (Last 24 hours) at 9/27/2023 0610  Last data filed at 9/27/2023 0440  Gross per 24 hour   Intake 386 ml   Output 4184 ml   Net -3798 ml        Assessment and Plan:   Postpartum care:  - Patient doing well.  - Continue routine management and advances.  - Will transition PCA pump to PO meds   - Nexplanon to be placed prior to discharge.   - VTE: Lovenox qD     PreE w/SF  - BP as above  - asymptomatic  - preE labs as above, will repeat CMP tomorrow  - UOP: 2.1 cc/kg/hr  - Mag: continue 24hrs  - Hypertensive agent: procardia XL 60/30, labetalol 200 BID  - Will give additional Lasix 20 today  and transition to PO lasix    Anemia/ PPH  - H/H 10.0/32.0 appropriate   - QBL: 1084  - asymptomatic  - iron/colace    Bhakti Obregon MD/MPH  OB/GYN PGY-3  [facility]

## 2023-09-27 NOTE — CARE UPDATE
STAT chest x-ray obtained and images reviewed with radiologist:    FINDINGS:  The cardiac silhouette is mildly enlarged.  Airspace opacities are seen best in the right lower lung field.  There is obscuration of the left hemidiaphragm and right costophrenic angle.  No pneumothorax is detected.     Impression:  Findings concerning for mild cardiomegaly and pulmonary edema.  The possibility of a superimposed right lower lobe pneumonic process cannot be entirely excluded.  Recommend clinical correlation.      Discussed with on-call MFM with recommendation for proceeding with delivery due to new onset pulmonary edema. Will administer IV Lasix 40 mg now and proceed with delivery once MgSO4 bolus completed.  Discussed with patient the need for general anesthesia due to administration of Lovenox <12 hours ago, which she voiced understanding. I also discussed the above plan with patient's mother over the telephone and will update her following delivery.    Devin Diego M.D.  OB/GYN PGY- 4

## 2023-09-27 NOTE — PLAN OF CARE
Copied from baby's chart.     SOCIAL WORK DISCHARGE PLANNING ASSESSMENT     SW completed discharge planning assessment with pt's mother in mother's room 643 .  Pt's mother was easily engaged. Education on the role of  was provided. Emotional support provided throughout assessment.        Legal Name: Daren Sharma         :  2022  Address: [address] [place] [address] [place] [address]  Parent's Phone Numbers: [phone] (Carlosbharath)     Pediatrician:  [facility]     Education: Information given on NICU Education Classes; Physician/NNP daily rounds; and Postpartum Depression signs.   Potential Eligibility for SSI Benefits: Yes. Sw to provide diagnosis letter for application process.            Patient Active Problem List   Diagnosis    Prematurity, 500-749 grams, 25-26 completed weeks    Alteration in nutrition in infant    Healthcare maintenance    Respiratory distress syndrome in     At risk for apnea    History of vascular access device            Birth Hospital:[facility]           MORTEZA: 2023     Birth Weight:   0.72 kg (1 lb 9.4 oz)                                   Gestational Age: 25w6d           Apgars    Living status: Living  Apgar Component Scores:  1 min.:  5 min.:  10 min.:  15 min.:  20 min.:    Skin color:  0  0          Heart rate:  1  2          Reflex irritability:  1  2          Muscle tone:  2  2          Respiratory effort:  0  2          Total:  4  8          Apgars assigned by: NICU              23 1245   NICU Assessment   Assessment Type Discharge Planning Assessment   Source of Information family   Verified Demographic and Insurance Information Yes   Insurance Medicaid   Medicaid [facility]   Pastoral Care/Clergy/ Contact Status none needed   Lives With mother;sister;brother   Name(s) of People in Home Steve Sharma (MOB); Fallon (Sister); Desmond (Sister); Yovana (Sister)   Number people in home 5 including pt.    Relationship Status of Parents None   Primary Source of Support/Comfort parent   Other children (include names and ages) Fallon-13 (Sister); Desmond-4 (Sister); Yovana-3 (Sister)   Mother's Employer [employer]   Currently Enrolled in School No   Father's Involvement None   Is Father signing the birth certificate No   Family Involvement High   Other Contacts Names and Numbers Alecia Randolph [phone]   Does baby have crib or safe sleep space? Yes   Do you have a car seat? Yes   Resource/Environmental Concerns none   Current Resources Other  (WIC)   Potential Discharge Needs Early Intervention Program   Resources/Education Provided [facility];Glossary of Commonly Used Terms;SSI Benefits;Preparing for Your Baby's Discharge Home;Support Resources for NICU Families;Early Intervention Program;Post Partum Depression;My Preemi Marquez;My NICU Baby Marquez;[facility]   DME Needed Upon Discharge  none   DCFS No indications (Indicators for Report)   Discharge Plan A Home with family

## 2023-09-27 NOTE — PLAN OF CARE
VSS. On magnesium until 2359, no severe pressures this shift. Metz catheter in place. Fundus firm without massage, midline, with moderate rubra noted. Pain controlled with scheduled meds and PCA pump. Safety maintained, bed low and in a locked position. Pumping every 2-3 hours. No further concerns at this time, will continue to monitor.

## 2023-09-27 NOTE — ANESTHESIA PROCEDURE NOTES
Intubation    Date/Time: 9/26/2023 11:55 PM    Performed by: Leonel Olivo MD  Authorized by: Pablo Selby MD    Intubation:     Induction:  Rapid sequence induction    Intubated:  Postinduction    Mask Ventilation:  Not attempted    Attempts:  1    Attempted By:  Resident anesthesiologist    Method of Intubation:  Video laryngoscopy    Blade:  Haro 3    Laryngeal View Grade: Grade I - full view of cords      Difficult Airway Encountered?: No      Complications:  None    Airway Device:  Oral endotracheal tube    Airway Device Size:  7.0    Style/Cuff Inflation:  Cuffed (inflated to minimal occlusive pressure)    Tube secured:  21    Secured at:  The lips    Placement Verified By:  Capnometry    Complicating Factors:  None    Findings Post-Intubation:  BS equal bilateral and atraumatic/condition of teeth unchanged

## 2023-09-28 PROBLEM — D62 ACUTE ON CHRONIC BLOOD LOSS ANEMIA: Status: ACTIVE | Noted: 2023-09-25

## 2023-09-28 PROBLEM — D64.9 ANEMIA: Status: RESOLVED | Noted: 2023-09-24 | Resolved: 2023-09-28

## 2023-09-28 LAB
ALBUMIN SERPL BCP-MCNC: 1.9 G/DL (ref 3.5–5.2)
ALP SERPL-CCNC: 65 U/L (ref 55–135)
ALT SERPL W/O P-5'-P-CCNC: 11 U/L (ref 10–44)
ANION GAP SERPL CALC-SCNC: 7 MMOL/L (ref 8–16)
AST SERPL-CCNC: 21 U/L (ref 10–40)
BASOPHILS # BLD AUTO: 0.01 K/UL (ref 0–0.2)
BASOPHILS NFR BLD: 0.1 % (ref 0–1.9)
BILIRUB SERPL-MCNC: 0.2 MG/DL (ref 0.1–1)
BUN SERPL-MCNC: 13 MG/DL (ref 6–20)
CALCIUM SERPL-MCNC: 7.1 MG/DL (ref 8.7–10.5)
CHLORIDE SERPL-SCNC: 101 MMOL/L (ref 95–110)
CO2 SERPL-SCNC: 25 MMOL/L (ref 23–29)
CREAT SERPL-MCNC: 0.7 MG/DL (ref 0.5–1.4)
DIFFERENTIAL METHOD: ABNORMAL
EOSINOPHIL # BLD AUTO: 0.2 K/UL (ref 0–0.5)
EOSINOPHIL NFR BLD: 1.2 % (ref 0–8)
ERYTHROCYTE [DISTWIDTH] IN BLOOD BY AUTOMATED COUNT: 14.4 % (ref 11.5–14.5)
EST. GFR  (NO RACE VARIABLE): >60 ML/MIN/1.73 M^2
GLUCOSE SERPL-MCNC: 88 MG/DL (ref 70–110)
HCT VFR BLD AUTO: 23.8 % (ref 37–48.5)
HGB BLD-MCNC: 7.6 G/DL (ref 12–16)
IMM GRANULOCYTES # BLD AUTO: 0.08 K/UL (ref 0–0.04)
IMM GRANULOCYTES NFR BLD AUTO: 0.5 % (ref 0–0.5)
LYMPHOCYTES # BLD AUTO: 2.5 K/UL (ref 1–4.8)
LYMPHOCYTES NFR BLD: 15.4 % (ref 18–48)
MCH RBC QN AUTO: 26.1 PG (ref 27–31)
MCHC RBC AUTO-ENTMCNC: 31.9 G/DL (ref 32–36)
MCV RBC AUTO: 82 FL (ref 82–98)
MONOCYTES # BLD AUTO: 2 K/UL (ref 0.3–1)
MONOCYTES NFR BLD: 12.2 % (ref 4–15)
NEUTROPHILS # BLD AUTO: 11.4 K/UL (ref 1.8–7.7)
NEUTROPHILS NFR BLD: 70.6 % (ref 38–73)
NRBC BLD-RTO: 0 /100 WBC
PLATELET # BLD AUTO: 318 K/UL (ref 150–450)
PMV BLD AUTO: 9.6 FL (ref 9.2–12.9)
POTASSIUM SERPL-SCNC: 4.7 MMOL/L (ref 3.5–5.1)
PROT SERPL-MCNC: 4.8 G/DL (ref 6–8.4)
RBC # BLD AUTO: 2.91 M/UL (ref 4–5.4)
SODIUM SERPL-SCNC: 133 MMOL/L (ref 136–145)
WBC # BLD AUTO: 16.09 K/UL (ref 3.9–12.7)

## 2023-09-28 PROCEDURE — 36415 COLL VENOUS BLD VENIPUNCTURE: CPT | Performed by: STUDENT IN AN ORGANIZED HEALTH CARE EDUCATION/TRAINING PROGRAM

## 2023-09-28 PROCEDURE — 25000003 PHARM REV CODE 250: Performed by: STUDENT IN AN ORGANIZED HEALTH CARE EDUCATION/TRAINING PROGRAM

## 2023-09-28 PROCEDURE — 99233 PR SUBSEQUENT HOSPITAL CARE,LEVL III: ICD-10-PCS | Mod: ,,, | Performed by: OBSTETRICS & GYNECOLOGY

## 2023-09-28 PROCEDURE — 99233 SBSQ HOSP IP/OBS HIGH 50: CPT | Mod: ,,, | Performed by: OBSTETRICS & GYNECOLOGY

## 2023-09-28 PROCEDURE — 63600175 PHARM REV CODE 636 W HCPCS: Performed by: STUDENT IN AN ORGANIZED HEALTH CARE EDUCATION/TRAINING PROGRAM

## 2023-09-28 PROCEDURE — 80053 COMPREHEN METABOLIC PANEL: CPT | Performed by: STUDENT IN AN ORGANIZED HEALTH CARE EDUCATION/TRAINING PROGRAM

## 2023-09-28 PROCEDURE — 25000003 PHARM REV CODE 250

## 2023-09-28 PROCEDURE — 85025 COMPLETE CBC W/AUTO DIFF WBC: CPT | Performed by: STUDENT IN AN ORGANIZED HEALTH CARE EDUCATION/TRAINING PROGRAM

## 2023-09-28 PROCEDURE — 11000001 HC ACUTE MED/SURG PRIVATE ROOM

## 2023-09-28 RX ORDER — LIDOCAINE HYDROCHLORIDE 10 MG/ML
5 INJECTION, SOLUTION EPIDURAL; INFILTRATION; INTRACAUDAL; PERINEURAL ONCE
Status: COMPLETED | OUTPATIENT
Start: 2023-09-28 | End: 2023-09-29

## 2023-09-28 RX ADMIN — IBUPROFEN 800 MG: 400 TABLET ORAL at 08:09

## 2023-09-28 RX ADMIN — ACETAMINOPHEN 650 MG: 325 TABLET, FILM COATED ORAL at 05:09

## 2023-09-28 RX ADMIN — NIFEDIPINE 60 MG: 30 TABLET, FILM COATED, EXTENDED RELEASE ORAL at 08:09

## 2023-09-28 RX ADMIN — ENOXAPARIN SODIUM 40 MG: 40 INJECTION SUBCUTANEOUS at 05:09

## 2023-09-28 RX ADMIN — DOCUSATE SODIUM 200 MG: 100 CAPSULE, LIQUID FILLED ORAL at 08:09

## 2023-09-28 RX ADMIN — NIFEDIPINE 30 MG: 30 TABLET, FILM COATED, EXTENDED RELEASE ORAL at 09:09

## 2023-09-28 RX ADMIN — ACETAMINOPHEN 650 MG: 325 TABLET, FILM COATED ORAL at 12:09

## 2023-09-28 RX ADMIN — LABETALOL HYDROCHLORIDE 200 MG: 200 TABLET, FILM COATED ORAL at 09:09

## 2023-09-28 RX ADMIN — FERROUS SULFATE TAB 325 MG (65 MG ELEMENTAL FE) 1 EACH: 325 (65 FE) TAB at 08:09

## 2023-09-28 RX ADMIN — FUROSEMIDE 20 MG: 20 TABLET ORAL at 08:09

## 2023-09-28 RX ADMIN — IBUPROFEN 800 MG: 400 TABLET ORAL at 05:09

## 2023-09-28 RX ADMIN — IBUPROFEN 800 MG: 400 TABLET ORAL at 12:09

## 2023-09-28 RX ADMIN — DOCUSATE SODIUM 200 MG: 100 CAPSULE, LIQUID FILLED ORAL at 09:09

## 2023-09-28 RX ADMIN — LABETALOL HYDROCHLORIDE 200 MG: 200 TABLET, FILM COATED ORAL at 08:09

## 2023-09-28 RX ADMIN — OXYCODONE HYDROCHLORIDE 5 MG: 5 TABLET ORAL at 12:09

## 2023-09-28 NOTE — PROGRESS NOTES
POSTPARTUM PROGRESS NOTE    Subjective:     PPD/POD#: 2   Procedure: Repeat classical C/S   EGA: 25w6d   N/V: No   F/C: No   Abd Pain: Mild, well-controlled with oral pain medication   Lochia: Mild   Voiding: Yes   Ambulating: Yes   Bowel fnc: Yes, passing gas   Contraception: Nexplanon to be placed prior to discharge      Objective:      Temp:  [98 °F (36.7 °C)-98.6 °F (37 °C)] 98 °F (36.7 °C)  Pulse:  [] 82  Resp:  [16-22] 16  SpO2:  [95 %-99 %] 97 %  BP: (122-147)/(64-90) 125/76    Abdomen: Soft, appropriately tender, mildly distended, hypoactive   Uterus: Firm, no fundal tenderness   Incision: Bandage in place without shadowing     Lab Review    Recent Labs   Lab 09/23/23  0859 09/24/23  0245 09/26/23 2013 09/28/23  0524   NA  --  135* 137 133*   K  --  3.8 4.5 4.7   CL  --  107 106 101   CO2  --  21* 22* 25   BUN  --  15 21* 13   CREATININE  --  0.7 0.7 0.7   GLU  --  109 100 88   PROT  --  5.4* 5.5* 4.8*   BILITOT  --  0.1 0.2 0.2   ALKPHOS  --  69 77 65   ALT  --  10 10 11   AST  --  13 15 21   MG 7.5*  --   --   --        Recent Labs   Lab 09/26/23 2013 09/27/23  0013 09/27/23  0015 09/27/23  0435 09/28/23  0524   WBC 14.74*  --   --  22.52* 16.09*   HGB 10.2*  --   --  10.0* 7.6*   HCT 32.1*   < > 37 32.0* 23.8*   MCV 79*  --   --  81* 82     --   --  372 318    < > = values in this interval not displayed.         I/O    Intake/Output Summary (Last 24 hours) at 9/28/2023 0701  Last data filed at 9/28/2023 0300  Gross per 24 hour   Intake 2070.26 ml   Output 2925 ml   Net -854.74 ml        Assessment and Plan:   Postpartum care:  - Patient doing well.  - Continue routine management and advances.  - Patient tolerates PO pain med transition  - Nexplanon to be placed prior to discharge.   - VTE: Lovenox qD     PreE w/SF with pulmonary edema  - BP as above  - asymptomatic  - preE labs as above, repeat CMP WNL  - UOP adequate 1.5 cc/kg/hr   - S/p 24 hr Mg  - Hypertensive agent: procardia XL 60/30,  labetalol 200 BID  - Transitioned to PO Lasix 20mg x 5 days    Anemia/ PPH  - H/H 10.0/32.0 appropriate repeat 9/28/23 H/H 7.6/23.8  - QBL: 1084  - asymptomatic  - iron/colace    Bhakti Obregon MD/MPH  OB/GYN PGY-3  [facility]

## 2023-09-28 NOTE — ASSESSMENT & PLAN NOTE
- PreE w/ SF diagnosed in the setting of new proteinuria and sustained severely elevated BP requiring multiple acute anti-hypertensives, has since stabilized.   - Asymptomatic for PreE s/s   - VSS, mild range; Will continue to monitor q 4hrs or PRN   - Labs; Continue q 72H unless otherwise indicated (last 09/24, next 09/27)    - PCR 3.74 (Baseline 0.08 on 09/30), 24hrU 3564    - Plts 401    - Cr 0.7    - AST/ALT 13/10   - Current Regimen:    - Procardia XL 60 mg (09/24)   - VTE PPx: Lovenox qD in the setting of nephrotic range proteinuria; Will need to be discontinued 12hrs prior to neuraxial anesthesia   - Will continue to monitor blood pressures closely and optimize with uptitration of medications as needed   - Will continue inpatient management at this time with ideal delivery at 34w0d unless otherwise indicated by maternal or fetal indications   -CMP WN

## 2023-09-29 VITALS
DIASTOLIC BLOOD PRESSURE: 83 MMHG | SYSTOLIC BLOOD PRESSURE: 135 MMHG | HEIGHT: 61 IN | HEART RATE: 98 BPM | OXYGEN SATURATION: 95 % | BODY MASS INDEX: 33.59 KG/M2 | WEIGHT: 177.94 LBS | TEMPERATURE: 99 F | RESPIRATION RATE: 18 BRPM

## 2023-09-29 PROBLEM — Z3A.25 25 WEEKS GESTATION OF PREGNANCY: Status: RESOLVED | Noted: 2023-09-24 | Resolved: 2023-09-29

## 2023-09-29 PROCEDURE — 11981 INSERTION DRUG DLVR IMPLANT: CPT

## 2023-09-29 PROCEDURE — 63600175 PHARM REV CODE 636 W HCPCS

## 2023-09-29 PROCEDURE — 25000003 PHARM REV CODE 250: Performed by: STUDENT IN AN ORGANIZED HEALTH CARE EDUCATION/TRAINING PROGRAM

## 2023-09-29 PROCEDURE — 25000003 PHARM REV CODE 250

## 2023-09-29 PROCEDURE — 99233 SBSQ HOSP IP/OBS HIGH 50: CPT | Mod: ,,, | Performed by: OBSTETRICS & GYNECOLOGY

## 2023-09-29 PROCEDURE — 63600175 PHARM REV CODE 636 W HCPCS: Performed by: STUDENT IN AN ORGANIZED HEALTH CARE EDUCATION/TRAINING PROGRAM

## 2023-09-29 PROCEDURE — 99233 PR SUBSEQUENT HOSPITAL CARE,LEVL III: ICD-10-PCS | Mod: ,,, | Performed by: OBSTETRICS & GYNECOLOGY

## 2023-09-29 PROCEDURE — C9113 INJ PANTOPRAZOLE SODIUM, VIA: HCPCS | Performed by: STUDENT IN AN ORGANIZED HEALTH CARE EDUCATION/TRAINING PROGRAM

## 2023-09-29 RX ORDER — NIFEDIPINE 60 MG/1
60 TABLET, EXTENDED RELEASE ORAL DAILY
Qty: 30 TABLET | Refills: 2 | Status: SHIPPED | OUTPATIENT
Start: 2023-09-30 | End: 2024-09-29

## 2023-09-29 RX ORDER — LABETALOL 200 MG/1
200 TABLET, FILM COATED ORAL EVERY 12 HOURS
Qty: 60 TABLET | Refills: 2 | Status: SHIPPED | OUTPATIENT
Start: 2023-09-29 | End: 2024-09-28

## 2023-09-29 RX ORDER — NIFEDIPINE 30 MG/1
30 TABLET, EXTENDED RELEASE ORAL NIGHTLY
Qty: 30 TABLET | Refills: 2 | Status: SHIPPED | OUTPATIENT
Start: 2023-09-29 | End: 2024-09-28

## 2023-09-29 RX ORDER — FUROSEMIDE 20 MG/1
20 TABLET ORAL DAILY
Qty: 3 TABLET | Refills: 0 | Status: SHIPPED | OUTPATIENT
Start: 2023-09-30 | End: 2023-10-03

## 2023-09-29 RX ORDER — DOCUSATE SODIUM 100 MG/1
200 CAPSULE, LIQUID FILLED ORAL 2 TIMES DAILY
Qty: 30 CAPSULE | Refills: 0 | Status: SHIPPED | OUTPATIENT
Start: 2023-09-29

## 2023-09-29 RX ORDER — ACETAMINOPHEN 325 MG/1
650 TABLET ORAL EVERY 6 HOURS
Qty: 30 TABLET | Refills: 0 | Status: SHIPPED | OUTPATIENT
Start: 2023-09-29

## 2023-09-29 RX ORDER — IBUPROFEN 800 MG/1
800 TABLET ORAL EVERY 8 HOURS
Qty: 60 TABLET | Refills: 0 | Status: SHIPPED | OUTPATIENT
Start: 2023-09-29

## 2023-09-29 RX ORDER — FERROUS SULFATE 325(65) MG
325 TABLET, DELAYED RELEASE (ENTERIC COATED) ORAL DAILY
Qty: 60 TABLET | Refills: 0 | Status: SHIPPED | OUTPATIENT
Start: 2023-09-29

## 2023-09-29 RX ORDER — OXYCODONE HYDROCHLORIDE 5 MG/1
5 TABLET ORAL EVERY 4 HOURS PRN
Qty: 20 TABLET | Refills: 0 | Status: SHIPPED | OUTPATIENT
Start: 2023-09-29

## 2023-09-29 RX ADMIN — ACETAMINOPHEN 650 MG: 325 TABLET, FILM COATED ORAL at 02:09

## 2023-09-29 RX ADMIN — ETONOGESTREL 68 MG: 68 IMPLANT SUBCUTANEOUS at 11:09

## 2023-09-29 RX ADMIN — NIFEDIPINE 60 MG: 30 TABLET, FILM COATED, EXTENDED RELEASE ORAL at 08:09

## 2023-09-29 RX ADMIN — ACETAMINOPHEN 650 MG: 325 TABLET, FILM COATED ORAL at 12:09

## 2023-09-29 RX ADMIN — PRENATAL VIT W/ FE FUMARATE-FA TAB 27-0.8 MG 1 TABLET: 27-0.8 TAB at 08:09

## 2023-09-29 RX ADMIN — FERROUS SULFATE TAB 325 MG (65 MG ELEMENTAL FE) 1 EACH: 325 (65 FE) TAB at 08:09

## 2023-09-29 RX ADMIN — ACETAMINOPHEN 650 MG: 325 TABLET, FILM COATED ORAL at 06:09

## 2023-09-29 RX ADMIN — IBUPROFEN 800 MG: 400 TABLET ORAL at 12:09

## 2023-09-29 RX ADMIN — FUROSEMIDE 20 MG: 20 TABLET ORAL at 08:09

## 2023-09-29 RX ADMIN — DOCUSATE SODIUM 200 MG: 100 CAPSULE, LIQUID FILLED ORAL at 08:09

## 2023-09-29 RX ADMIN — LABETALOL HYDROCHLORIDE 200 MG: 200 TABLET, FILM COATED ORAL at 08:09

## 2023-09-29 RX ADMIN — LIDOCAINE HYDROCHLORIDE 50 MG: 10 INJECTION, SOLUTION EPIDURAL; INFILTRATION; INTRACAUDAL; PERINEURAL at 11:09

## 2023-09-29 RX ADMIN — IBUPROFEN 800 MG: 400 TABLET ORAL at 08:09

## 2023-09-29 RX ADMIN — PANTOPRAZOLE SODIUM 40 MG: 40 INJECTION, POWDER, LYOPHILIZED, FOR SOLUTION INTRAVENOUS at 08:09

## 2023-09-29 NOTE — PROCEDURES
DATE: 9/29/23    PROCEDURE: NEXPLANON INSERTION      PRE-NEXAPLANON INSERTION COUNSELING:  All contraceptive options were reviewed and the patient chooses Nexplanon.  Patients history was reviewed and there were no contraindications to Nexplanon.  The procedure and minimal risks of pain, bleeding, bruising and infection at the insertion site discussed. Possible irregular menstrual bleeding pattern versus amenorrhea was explained.  No protection against STDs discussed.  Written information provided; all questions answered and patient agrees to proceed.  Consent signed and scanned into computer.    EXAM:    UPT performed prior to the procedure was negative.    With patient in supine position the nondominant (right) arm was flexed at the elbow and externally rotated.  The insertion site was identified 6-8 cm above the medial epicondyle  The insertion site was marked and a second ken was placed 6-8 cm above the first.    PROCEDURE:  TIME OUT PERFORMED.  The insertion site was prepped with antiseptic and injected with 3 cc of 1% Xylocaine without epinephrine subcutaneously along the planned insertion canal.    Using sterile technique, the Nexplanon applicator was visually verified and removed from the blister pack. The plastic cap overlying the needle tip was removed and the Nexplanon magda was visualized in the hollow of the needle. The needle was inserted bevel side up at a 20 degree angle to penetrate the skin. The applicator was lowered parallel to the arm and the skin was tented upward with the needle. The Nexplanon magda was then released by pressing the release button and slowly pulling backward. The needle was slowly and fully retracted back along full length of the obturator.    The magda was easily palpable just beneath the skin. The patient also verified that she could palpate the magda. A small adhesive bandage and then a pressure bandage was placed over the insertion site.  The patient tolerated the procedure  well.    EXP: 05/29/2025  LOT#: [id number]    ASSESSMENT:  1. Contraception management / Nexplanon insertion.V25.0.    POST NEXPLANON INSERTION COUNSELING:  Manage post Nexplanon placement arm pain with NSAIDs or Tylenol.     Keep arm elevated and apply intermittent ice packs to decrease pain and bruising for 24 hours.    May remove bandage in 24 hours.    Nexplanon danger signs (worsening pain at insertion site, bleeding through bandage, redness and/or pus drainage at insertion site).    Patient was also counseled on palpating the magda on a regular basis and to notify me immediately if she is unable to palpate the magda.    Removal in 3 years.    Counseling lasted approximately 15 minutes and all her questions were answered.    FOLLOW-UP: with primary OBGYN for routine exam.      Kendra Tomas MD  Obstetrics and Gynecology - PGY1

## 2023-09-29 NOTE — NURSING
The following message was sent to [facility]: Hi, can you please call ms brewer @ [phone] to set up a post partum bp check appt on Monday 10/2. She lives in [place] but will be visiting infant in nicu and requested bp check in [place]. I also would recommend a post partum mood check appt. (Pt appears flat and tearful on day of d/c) thank you

## 2023-09-29 NOTE — PROGRESS NOTES
POSTPARTUM PROGRESS NOTE    27 y.o. now  s/p repeat CCS at 25w6d for pulmonary edema in the context of preE w/ SF. Now s/p MgSO4, BP on target with current regimen. Pt with excellent UOP with furosemide, remains asymptomatic. Acute on chronic anemia related to PPH, pt remains asymptomatic, will manage with oral iron supplementation. Reports. Doing well and meeting milestones, infant stable in NICU.     Subjective:      PPD/POD#: 3   Procedure: Repeat classical C/S   EGA: 25w6d   N/V: No   F/C: No   Abd Pain: Mild, well-controlled with oral pain medication   Lochia: Mild   Voiding: Yes   Ambulating: Yes   Bowel fnc: Yes, passing gas, BM   Contraception: Nexplanon to be placed prior to discharge          Objective:       Temp:  [98.5 °F (36.9 °C)-98.8 °F (37.1 °C)] 98.8 °F (37.1 °C)  Pulse:  [82-99] 92  Resp:  [17-18] 18  SpO2:  [95 %-96 %] 95 %  BP: (128-133)/(72-83) 130/72    Abdomen: Soft, appropriately tender, mildly distended   Uterus: Firm, no fundal tenderness   Incision: Bandage in place without shadowing     Lab Review  No results found for this or any previous visit (from the past 4 hour(s)).    I/O    Intake/Output Summary (Last 24 hours) at 2023 1253  Last data filed at 2023 0300  Gross per 24 hour   Intake --   Output 700 ml   Net -700 ml          Assessment and Plan:   Postpartum care:  - Patient doing well.  - Continue routine management and advances.  - Patient tolerates PO pain med transition  - Nexplanon to be placed prior to discharge.   - VTE: Lovenox qD 40 through discharge     PreE w/SF with pulmonary edema  - BP as above  - asymptomatic  - preE labs as above, repeat CMP WNL  - UOP adequate 1.5 cc/kg/hr   - S/p 24 hr Mg  - Hypertensive regimen:   - Nifedipine XL 60 mg AM/30 mg PM  - Labetalol 200 mg BID  - Furosemide 20 mg daily x 5 days  - Follow in clinic early next week for BP check and again at 6w PP for routine postpartum care     Anemia/ PPH  - H/H 10.0/32.0 appropriate  repeat 23 H/H 7.6/23.8  - QBL: 1084  - remains asymptomatic  - Continue Fe sulfate supplementation daily        Attestation:  I have reviewed and concur with the resident/fellow's documentation, assessment, and plan.  I have personally interviewed and examined the patient at bedside.  My additions are in blue.    27 y.o. now  s/p repeat LTCS at 25w6d for CCS at 25w6d for pulmonary edema in the context of preE w/ SF. Now s/p MgSO4, BP on target with current regimen. Pt with excellent UOP with furosemide, remains asymptomatic. Acute on chronic anemia related to PPH, pt remains asymptomatic, will manage with oral iron supplementation. Reports. Doing well and meeting milestones, infant stable in NICU. Doing well and meeting milestones, will plan for discharge this afternoon vs tomorrow.     Postpartum, preeclampsia and postoperative precautions reviewed.  Follow with primary OB in 1 week for BP check and again at 6 weeks for routine postpartum care.    PARK Randolph MD/MPH  Maternal Fetal Medicine

## 2023-09-29 NOTE — MEDICAL/APP STUDENT
POSTPARTUM PROGRESS NOTE    27 y.o. now  s/p repeat CCS at 25w6d for pulmonary edema in the context of preE w/ SF. Now s/p MgSO4, BP on target with current regimen. Pt with excellent UOP with furosemide, remains asymptomatic. Acute on chronic anemia related to PPH, pt remains asymptomatic, will manage with oral iron supplementation. Reports. Doing well and meeting milestones, infant stable in NICU. Continue inpt mgmt through PP day 3-4. Vocalized anxiety about discharge this AM.     Subjective:      PPD/POD#: 3   Procedure: Repeat classical C/S   EGA: 25w6d   N/V: No   F/C: No   Abd Pain: Mild, well-controlled with oral pain medication   Lochia: Mild   Voiding: Yes   Ambulating: Yes   Bowel fnc: Yes, passing gas, BM   Contraception: Nexplanon to be placed prior to discharge          Objective:       Temp:  [98.5 °F (36.9 °C)-99 °F (37.2 °C)] 98.6 °F (37 °C)  Pulse:  [] 82  Resp:  [17-18] 17  SpO2:  [94 %-97 %] 95 %  BP: (128-141)/(78-87) 133/83    Abdomen: Soft, appropriately tender, mildly distended   Uterus: Firm, no fundal tenderness   Incision: Bandage in place without shadowing     Lab Review  No results found for this or any previous visit (from the past 4 hour(s)).    I/O    Intake/Output Summary (Last 24 hours) at 2023 0707  Last data filed at 2023 0300  Gross per 24 hour   Intake --   Output 700 ml   Net -700 ml        Assessment and Plan:   Postpartum care:  - Patient doing well.  - Continue routine management and advances.  - Patient tolerates PO pain med transition  - Nexplanon to be placed prior to discharge.   - VTE: Lovenox qD 40     PreE w/SF with pulmonary edema  - BP as above  - asymptomatic  - preE labs as above, repeat CMP WNL  - UOP adequate 1.5 cc/kg/hr   - S/p 24 hr Mg  - Hypertensive agent: procardia XL 60/30, labetalol 200 BID  - Transitioned to PO Lasix 20mg x 5 days     Anemia/ PPH  - H/H 10.0/32.0 appropriate repeat / H/H 7.6/23.8  - QBL: 1084  - remains  asymptomatic

## 2023-09-29 NOTE — DISCHARGE SUMMARY
Delivery Discharge Summary  Obstetrics      Primary OB Clinician: Kailye     Admission date: 2023  Discharge date: 2023    Disposition: To home, self care    Admit Dx:      Patient Active Problem List   Diagnosis    Abdominal pain in pregnancy, second trimester    Severe pre-eclampsia in second trimester    History of  section    Acute on chronic blood loss anemia    Postpartum hemorrhage     Discharge Dx:    Patient Active Problem List   Diagnosis    Abdominal pain in pregnancy, second trimester    Severe pre-eclampsia in second trimester    History of  section    Acute on chronic blood loss anemia    Postpartum hemorrhage       Procedure: , due to PreE with SF     Hospital Course:  Steve Sharma is a 27 y.o. now , POD #3 s/p rCCS who was admitted on 2023 at 25w6d for PreE w/ SF. This IUP was complicated by anemia, history of  section. Hospital course as below.     23: Transferred from OSH to [facility] for PreE w/ SF. Asymptomatic for PreE s/s. Initially severely elevated BP, followed by non-severe. All other vitals wnl. EFM/TOCO RR/ No CTX. Started on Procardia XL 30 mg. Will continue inpatient management.   2023: HD#2. NAEO. Asymptomatic for PreE s/s. VSS. Current anti-hypertensive regimen: Procardia XL 30mg. Will continue inpatient management until 34w0d unless maternal/fetal indication.  2023: HD#3. NAEO. Asymptomatic for PreE s/s. VSS, BP mild range. Current anti-hypertensive regimen: Procardia XL 60mg qD. Will continue inpatient management until 34w0d unless maternal/fetal indication.     Patient was subsequently admitted to antepartum unit with signed consents.  Patient delivered a single viable  female via rCCS. Please see delivery note for further details. Pt was in stable condition post delivery and was transferred to the Mother-Baby Unit. Her postpartum course was uncomplicated. On discharge day, patient's pain is  controlled with oral pain medications. Pt is tolerating ambulation without SOB or CP, and PO diet without N/V. Reports lochia is mild. Denies any HA, vision changes, F/C, LE swelling. Pt in stable condition and ready for discharge. Discharge instructions and precautions reviewed.    Nexplanon placed prior to discharge. Patient discharge on procardia 60/30 and labetalol 200 BID. Additionally lasix 20 mg x5 day course.     Pertinent studies:  Postpartum CBC  Lab Results   Component Value Date    WBC 16.09 (H) 2023    HGB 7.6 (L) 2023    HCT 23.8 (L) 2023    MCV 82 2023     2023     Tubal Ligation: n/a  Feeding Method: pumping  Rh Immune Globulin Given(B POS): N/A=  Rubella Vaccine Given: N/A  Tdap Vaccine Given: N/A    Delivery:    Episiotomy:     Lacerations:     Repair suture:     Repair # of packets:     Blood loss (ml):       Birth information:  YOB: [birth year]23   Time of birth: 11:59 PM   Sex: female   Delivery type: , Classical   Gestational Age: 25w6d     Measurements    Weight:   Length:          Delivery Clinician: Delivery Providers    Delivering clinician: Rima Bee MD   Provider Role    Devin Diego MD Resident    Janet Lai RN Circulator    Bernadette Burgos ST Scrub Person    Leonel Olivo MD Anesthesiologist    Breana Ornelas RN Charge Nurse    Brittany Ramos RN Registered Nurse    Pablo Selby MD Anesthesiologist             Additional  information:  Forceps:    Vacuum:    Breech:    Observed anomalies      Living?:     Apgars    Living status: Living  Apgar Component Scores:  1 min.:  5 min.:  10 min.:  15 min.:  20 min.:    Skin color:  0  0       Heart rate:  1  2       Reflex irritability:  1  2       Muscle tone:  2  2       Respiratory effort:  0  2       Total:  4  8       Apgars assigned by: NICU         Placenta: Delivered:       appearance    Patient Instructions:   Current Discharge Medication List        START  taking these medications    Details   acetaminophen (TYLENOL) 325 MG tablet Take 2 tablets (650 mg total) by mouth every 6 (six) hours.  Qty: 30 tablet, Refills: 0      docusate sodium (COLACE) 100 MG capsule Take 2 capsules (200 mg total) by mouth 2 (two) times daily.  Qty: 30 capsule, Refills: 0      ferrous sulfate 325 (65 FE) MG EC tablet Take 1 tablet (325 mg total) by mouth once daily.  Qty: 60 tablet, Refills: 0      furosemide (LASIX) 20 MG tablet Take 1 tablet (20 mg total) by mouth once daily. for 3 days  Qty: 3 tablet, Refills: 0      ibuprofen (ADVIL,MOTRIN) 800 MG tablet Take 1 tablet (800 mg total) by mouth every 8 (eight) hours.  Qty: 60 tablet, Refills: 0      labetaloL (NORMODYNE) 200 MG tablet Take 1 tablet (200 mg total) by mouth every 12 (twelve) hours.  Qty: 60 tablet, Refills: 2    Comments: .      !! NIFEdipine (PROCARDIA-XL) 30 MG (OSM) 24 hr tablet Take 1 tablet (30 mg total) by mouth every evening.  Qty: 30 tablet, Refills: 2    Comments: .      !! NIFEdipine (PROCARDIA-XL) 60 MG (OSM) 24 hr tablet Take 1 tablet (60 mg total) by mouth once daily.  Qty: 30 tablet, Refills: 2    Comments: .      oxyCODONE (ROXICODONE) 5 MG immediate release tablet Take 1 tablet (5 mg total) by mouth every 4 (four) hours as needed for Pain.  Qty: 20 tablet, Refills: 0    Comments: Quantity prescribed more than 7 day supply? No       !! - Potential duplicate medications found. Please discuss with provider.        CONTINUE these medications which have NOT CHANGED    Details   aspirin (ECOTRIN) 81 MG EC tablet Take 81 mg by mouth once daily.      prenatal vits62/FA/om3/dha/epa (PRENATAL GUMMY ORAL) Take 2 tablets by mouth once daily.             Discharge Procedure Orders   BREAST PUMP FOR HOME USE     Order Specific Question Answer Comments   Type of pump: Electric    Weight: 80.3 kg (177 lb 0.5 oz)    Length of need (1-99 months): 99      Diet Adult Regular     Lifting restrictions   Order Comments: No lifting  more than 15 pounds for 6 weeks     No driving until:   Order Comments: - Not taking narcotic pain medications  - You feel that you can safely slam on the brakes     Pelvic Rest   Order Comments: Nothing in vagina until evaluation at postpartum visit (no sex, tampons, or douching)     No dressing needed     Notify your health care provider if you experience any of the following:  temperature >100.4     Notify your health care provider if you experience any of the following:  persistent nausea and vomiting or diarrhea     Notify your health care provider if you experience any of the following:  severe uncontrolled pain     Notify your health care provider if you experience any of the following:  redness, tenderness, or signs of infection (pain, swelling, redness, odor or green/yellow discharge around incision site)     Notify your health care provider if you experience any of the following:  difficulty breathing or increased cough     Notify your health care provider if you experience any of the following:  severe persistent headache     Notify your health care provider if you experience any of the following:  worsening rash     Notify your health care provider if you experience any of the following:  persistent dizziness, light-headedness, or visual disturbances     Notify your health care provider if you experience any of the following:  increased confusion or weakness     Notify your health care provider if you experience any of the following:   Order Comments: - Heavy vaginal bleeding soaking through more than 2 pads/hour for > 2 hours  - Severe abdominal pain  - Drainage from your incision  - Headache not relieved with Tylenol  - Vision changes  - Chest pain or shortness or breath     Activity as tolerated     Shower on day dressing removed (No bath)   Order Comments: Do not submerge your incision in a bathtub until evaluation at postpartum visit       Bhakti Obregon MD/MPH  OB/GYN PGY-2  [facility]

## 2023-09-29 NOTE — LACTATION NOTE
Pt shared that she plans to discharge. LC reinforced that Pt can stay another day. LC did DC teaching for NICU mother pumping for her baby. Pt has NICU Mothers Lactation Booklet for lactation. Reviewed how to work pump, keep track of pumpings, label breastmilk, clean pump parts and safely store and transport milk. Pt aware to pump 8 or more times a day for 15-20 minutes. Pt is using a  pump at home and is aware that she can use the [employer] breastpump at the baby's bedside when she visits. LC provided Pt information to obtain pump through insurance. Mother has lactation phone number to call for further questions. Pt tearful when LC returned to room. LC asked if Pt would like to discuss why she is crying. Pt declined.  explained that social work is available if Pt would like to talk to her. Pt declined. Pt verbalized understanding and questions answered.

## 2023-09-29 NOTE — PLAN OF CARE
Problem: Adult Inpatient Plan of Care  Goal: Plan of Care Review  Outcome: Met  Goal: Patient-Specific Goal (Individualized)  Outcome: Met  Goal: Absence of Hospital-Acquired Illness or Injury  Outcome: Met  Goal: Optimal Comfort and Wellbeing  Outcome: Met  Goal: Readiness for Transition of Care  Outcome: Met     Problem:  Fall Injury Risk  Goal: Absence of Fall, Infant Drop and Related Injury  Outcome: Met     Problem: Hypertensive Disorders in Pregnancy  Goal: Maternal-Fetal Stabilization  Outcome: Met     Problem: Maternal-Fetal Wellbeing  Goal: Optimal Maternal-Fetal Wellbeing  Outcome: Met     Problem: Adjustment to Role Transition (Postpartum  Delivery)  Goal: Successful Maternal Role Transition  Outcome: Met     Problem: Bleeding (Postpartum  Delivery)  Goal: Hemostasis  Outcome: Met     Problem: Infection (Postpartum  Delivery)  Goal: Absence of Infection Signs and Symptoms  Outcome: Met     Problem: Pain (Postpartum  Delivery)  Goal: Acceptable Pain Control  Outcome: Met     Problem: Postoperative Nausea and Vomiting (Postpartum  Delivery)  Goal: Nausea and Vomiting Relief  Outcome: Met     Problem: Postoperative Urinary Retention (Postpartum  Delivery)  Goal: Effective Urinary Elimination  Outcome: Met     Problem: Infection  Goal: Absence of Infection Signs and Symptoms  Outcome: Met     Problem: Skin Injury Risk Increased  Goal: Skin Health and Integrity  Outcome: Met     Problem: Breastfeeding  Goal: Effective Breastfeeding  Outcome: Met

## 2023-09-30 LAB
BLD PROD TYP BPU: NORMAL
BLD PROD TYP BPU: NORMAL
BLOOD UNIT EXPIRATION DATE: NORMAL
BLOOD UNIT EXPIRATION DATE: NORMAL
BLOOD UNIT TYPE CODE: 5100
BLOOD UNIT TYPE CODE: 5100
BLOOD UNIT TYPE: NORMAL
BLOOD UNIT TYPE: NORMAL
CODING SYSTEM: NORMAL
CODING SYSTEM: NORMAL
CROSSMATCH INTERPRETATION: NORMAL
CROSSMATCH INTERPRETATION: NORMAL
DISPENSE STATUS: NORMAL
DISPENSE STATUS: NORMAL
TRANS ERYTHROCYTES VOL PATIENT: NORMAL ML
TRANS ERYTHROCYTES VOL PATIENT: NORMAL ML

## 2023-10-02 LAB
FINAL PATHOLOGIC DIAGNOSIS: NORMAL
GROSS: NORMAL
Lab: NORMAL

## 2023-10-10 NOTE — ANESTHESIA POSTPROCEDURE EVALUATION
Anesthesia Post Evaluation    Patient: Steve Sharma    Procedure(s) Performed: Procedure(s) (LRB):   SECTION (N/A)    Final Anesthesia Type: epidural      Patient location during evaluation: labor & delivery  Patient participation: Yes- Able to Participate  Level of consciousness: awake and alert  Post-procedure vital signs: reviewed and stable  Pain management: adequate  Airway patency: patent  MENG mitigation strategies: Use of major conduction anesthesia (spinal/epidural) or peripheral nerve block and Multimodal analgesia  PONV status at discharge: No PONV  Anesthetic complications: no      Cardiovascular status: blood pressure returned to baseline and hemodynamically stable  Respiratory status: unassisted and spontaneous ventilation  Hydration status: euvolemic  Follow-up not needed.          Vitals Value Taken Time   /83 23 1303   Temp 37.2 °C (99 °F) 23 1303   Pulse 98 23 1303   Resp 18 23 1303   SpO2 95 % 23 1303         Event Time   Out of Recovery 2023 03:30:00         Pain/Sidney Score: No data recorded

## 2025-06-02 ENCOUNTER — OFFICE VISIT (OUTPATIENT)
Dept: URGENT CARE | Facility: CLINIC | Age: 30
End: 2025-06-02
Payer: MEDICAID

## 2025-06-02 VITALS
WEIGHT: 178.38 LBS | SYSTOLIC BLOOD PRESSURE: 121 MMHG | HEIGHT: 61 IN | HEART RATE: 93 BPM | TEMPERATURE: 100 F | DIASTOLIC BLOOD PRESSURE: 86 MMHG | BODY MASS INDEX: 33.68 KG/M2 | RESPIRATION RATE: 17 BRPM | OXYGEN SATURATION: 100 %

## 2025-06-02 DIAGNOSIS — J02.9 VIRAL PHARYNGITIS: Primary | ICD-10-CM

## 2025-06-02 DIAGNOSIS — R50.9 FEVER, UNSPECIFIED FEVER CAUSE: ICD-10-CM

## 2025-06-02 LAB
CTP QC/QA: YES
HETEROPH AB SER QL: NEGATIVE
MOLECULAR STREP A: NEGATIVE
POC MOLECULAR INFLUENZA A AGN: NEGATIVE
POC MOLECULAR INFLUENZA B AGN: NEGATIVE
SARS-COV+SARS-COV-2 AG RESP QL IA.RAPID: NEGATIVE

## 2025-06-02 PROCEDURE — 87502 INFLUENZA DNA AMP PROBE: CPT | Mod: QW,S$GLB,,

## 2025-06-02 PROCEDURE — 87811 SARS-COV-2 COVID19 W/OPTIC: CPT | Mod: QW,S$GLB,,

## 2025-06-02 PROCEDURE — 86308 HETEROPHILE ANTIBODY SCREEN: CPT | Mod: QW,S$GLB,,

## 2025-06-02 PROCEDURE — 87651 STREP A DNA AMP PROBE: CPT | Mod: QW,S$GLB,,

## 2025-06-02 PROCEDURE — 99214 OFFICE O/P EST MOD 30 MIN: CPT | Mod: S$GLB,,,

## 2025-06-02 RX ORDER — ACETAMINOPHEN 500 MG
500 TABLET ORAL
Status: COMPLETED | OUTPATIENT
Start: 2025-06-02 | End: 2025-06-02

## 2025-06-02 RX ORDER — IBUPROFEN 600 MG/1
600 TABLET, FILM COATED ORAL EVERY 6 HOURS PRN
Qty: 20 TABLET | Refills: 0 | Status: SHIPPED | OUTPATIENT
Start: 2025-06-02 | End: 2025-06-07

## 2025-06-02 RX ADMIN — Medication 500 MG: at 02:06

## 2025-06-04 ENCOUNTER — OFFICE VISIT (OUTPATIENT)
Dept: URGENT CARE | Facility: CLINIC | Age: 30
End: 2025-06-04
Payer: MEDICAID

## 2025-06-04 VITALS
OXYGEN SATURATION: 98 % | DIASTOLIC BLOOD PRESSURE: 74 MMHG | HEART RATE: 71 BPM | WEIGHT: 178.38 LBS | HEIGHT: 61 IN | SYSTOLIC BLOOD PRESSURE: 115 MMHG | TEMPERATURE: 99 F | BODY MASS INDEX: 33.68 KG/M2 | RESPIRATION RATE: 18 BRPM

## 2025-06-04 DIAGNOSIS — J03.90 EXUDATIVE TONSILLITIS: Primary | ICD-10-CM

## 2025-06-04 LAB
CTP QC/QA: YES
MOLECULAR STREP A: NEGATIVE

## 2025-06-04 RX ORDER — AMOXICILLIN AND CLAVULANATE POTASSIUM 875; 125 MG/1; MG/1
1 TABLET, FILM COATED ORAL EVERY 12 HOURS
Qty: 20 TABLET | Refills: 0 | Status: SHIPPED | OUTPATIENT
Start: 2025-06-04 | End: 2025-06-14